# Patient Record
Sex: MALE | Race: BLACK OR AFRICAN AMERICAN | NOT HISPANIC OR LATINO | Employment: OTHER | URBAN - METROPOLITAN AREA
[De-identification: names, ages, dates, MRNs, and addresses within clinical notes are randomized per-mention and may not be internally consistent; named-entity substitution may affect disease eponyms.]

---

## 2018-03-19 ENCOUNTER — HOSPITAL ENCOUNTER (INPATIENT)
Facility: HOSPITAL | Age: 68
LOS: 4 days | Discharge: HOME | DRG: 331 | End: 2018-03-23
Attending: SURGERY | Admitting: SURGERY
Payer: MEDICARE

## 2018-03-19 DIAGNOSIS — R31.0 GROSS HEMATURIA: Primary | ICD-10-CM

## 2018-03-19 DIAGNOSIS — K61.1 PERIRECTAL ABSCESS: ICD-10-CM

## 2018-03-19 DIAGNOSIS — Z01.818 PREOP EXAMINATION: ICD-10-CM

## 2018-03-19 PROCEDURE — 11000001 HC ACUTE MED/SURG PRIVATE ROOM

## 2018-03-19 RX ORDER — SODIUM CHLORIDE, SODIUM LACTATE, POTASSIUM CHLORIDE, CALCIUM CHLORIDE 600; 310; 30; 20 MG/100ML; MG/100ML; MG/100ML; MG/100ML
INJECTION, SOLUTION INTRAVENOUS CONTINUOUS
Status: DISCONTINUED | OUTPATIENT
Start: 2018-03-20 | End: 2018-03-21

## 2018-03-19 RX ORDER — ACETAMINOPHEN 325 MG/1
650 TABLET ORAL EVERY 8 HOURS PRN
Status: DISCONTINUED | OUTPATIENT
Start: 2018-03-19 | End: 2018-03-23 | Stop reason: HOSPADM

## 2018-03-19 RX ORDER — HYDROMORPHONE HYDROCHLORIDE 2 MG/ML
0.5 INJECTION, SOLUTION INTRAMUSCULAR; INTRAVENOUS; SUBCUTANEOUS EVERY 4 HOURS PRN
Status: DISCONTINUED | OUTPATIENT
Start: 2018-03-19 | End: 2018-03-23 | Stop reason: HOSPADM

## 2018-03-19 RX ORDER — RAMELTEON 8 MG/1
8 TABLET ORAL NIGHTLY PRN
Status: DISCONTINUED | OUTPATIENT
Start: 2018-03-19 | End: 2018-03-23 | Stop reason: HOSPADM

## 2018-03-19 RX ORDER — DIPHENHYDRAMINE HYDROCHLORIDE 50 MG/ML
25 INJECTION INTRAMUSCULAR; INTRAVENOUS EVERY 4 HOURS PRN
Status: DISCONTINUED | OUTPATIENT
Start: 2018-03-19 | End: 2018-03-23 | Stop reason: HOSPADM

## 2018-03-19 RX ORDER — ONDANSETRON 8 MG/1
8 TABLET, ORALLY DISINTEGRATING ORAL EVERY 8 HOURS PRN
Status: DISCONTINUED | OUTPATIENT
Start: 2018-03-19 | End: 2018-03-23 | Stop reason: HOSPADM

## 2018-03-19 RX ORDER — SODIUM CHLORIDE 0.9 % (FLUSH) 0.9 %
3 SYRINGE (ML) INJECTION
Status: DISCONTINUED | OUTPATIENT
Start: 2018-03-19 | End: 2018-03-23 | Stop reason: HOSPADM

## 2018-03-20 ENCOUNTER — ANESTHESIA (OUTPATIENT)
Dept: SURGERY | Facility: HOSPITAL | Age: 68
DRG: 331 | End: 2018-03-20
Payer: MEDICARE

## 2018-03-20 ENCOUNTER — ANESTHESIA EVENT (OUTPATIENT)
Dept: SURGERY | Facility: HOSPITAL | Age: 68
DRG: 331 | End: 2018-03-20
Payer: MEDICARE

## 2018-03-20 ENCOUNTER — SURGERY (OUTPATIENT)
Age: 68
End: 2018-03-20

## 2018-03-20 LAB
ALBUMIN SERPL BCP-MCNC: 3 G/DL
ALBUMIN SERPL BCP-MCNC: 3.1 G/DL
ALP SERPL-CCNC: 68 U/L
ALP SERPL-CCNC: 69 U/L
ALT SERPL W/O P-5'-P-CCNC: 34 U/L
ALT SERPL W/O P-5'-P-CCNC: 34 U/L
ANION GAP SERPL CALC-SCNC: 7 MMOL/L
ANION GAP SERPL CALC-SCNC: 8 MMOL/L
ANISOCYTOSIS BLD QL SMEAR: SLIGHT
AST SERPL-CCNC: 32 U/L
AST SERPL-CCNC: 34 U/L
BASOPHILS # BLD AUTO: 0.02 K/UL
BASOPHILS NFR BLD: 0 %
BASOPHILS NFR BLD: 0.2 %
BILIRUB SERPL-MCNC: 1 MG/DL
BILIRUB SERPL-MCNC: 1 MG/DL
BUN SERPL-MCNC: 10 MG/DL
BUN SERPL-MCNC: 10 MG/DL
CALCIUM SERPL-MCNC: 9 MG/DL
CALCIUM SERPL-MCNC: 9.1 MG/DL
CHLORIDE SERPL-SCNC: 103 MMOL/L
CHLORIDE SERPL-SCNC: 103 MMOL/L
CO2 SERPL-SCNC: 25 MMOL/L
CO2 SERPL-SCNC: 27 MMOL/L
CREAT SERPL-MCNC: 0.8 MG/DL
CREAT SERPL-MCNC: 0.9 MG/DL
DIFFERENTIAL METHOD: ABNORMAL
DIFFERENTIAL METHOD: ABNORMAL
EOSINOPHIL # BLD AUTO: 0.1 K/UL
EOSINOPHIL NFR BLD: 0.8 %
EOSINOPHIL NFR BLD: 1 %
ERYTHROCYTE [DISTWIDTH] IN BLOOD BY AUTOMATED COUNT: 14.5 %
ERYTHROCYTE [DISTWIDTH] IN BLOOD BY AUTOMATED COUNT: 14.5 %
EST. GFR  (AFRICAN AMERICAN): >60 ML/MIN/1.73 M^2
EST. GFR  (AFRICAN AMERICAN): >60 ML/MIN/1.73 M^2
EST. GFR  (NON AFRICAN AMERICAN): >60 ML/MIN/1.73 M^2
EST. GFR  (NON AFRICAN AMERICAN): >60 ML/MIN/1.73 M^2
GLUCOSE SERPL-MCNC: 106 MG/DL
GLUCOSE SERPL-MCNC: 158 MG/DL
GRAM STN SPEC: NORMAL
HCT VFR BLD AUTO: 35.3 %
HCT VFR BLD AUTO: 35.3 %
HGB BLD-MCNC: 11 G/DL
HGB BLD-MCNC: 11.1 G/DL
HYPOCHROMIA BLD QL SMEAR: ABNORMAL
LYMPHOCYTES # BLD AUTO: 0.9 K/UL
LYMPHOCYTES NFR BLD: 10 %
LYMPHOCYTES NFR BLD: 9.8 %
MCH RBC QN AUTO: 23 PG
MCH RBC QN AUTO: 23.1 PG
MCHC RBC AUTO-ENTMCNC: 31.2 G/DL
MCHC RBC AUTO-ENTMCNC: 31.4 G/DL
MCV RBC AUTO: 74 FL
MCV RBC AUTO: 74 FL
MONOCYTES # BLD AUTO: 1.5 K/UL
MONOCYTES NFR BLD: 11 %
MONOCYTES NFR BLD: 15.9 %
NEUTROPHILS # BLD AUTO: 6.9 K/UL
NEUTROPHILS NFR BLD: 73.3 %
NEUTROPHILS NFR BLD: 78 %
OVALOCYTES BLD QL SMEAR: ABNORMAL
PLATELET # BLD AUTO: 179 K/UL
PLATELET # BLD AUTO: 204 K/UL
PLATELET BLD QL SMEAR: ABNORMAL
PLATELET BLD QL SMEAR: ABNORMAL
PMV BLD AUTO: 10.2 FL
PMV BLD AUTO: 9.5 FL
POCT GLUCOSE: 120 MG/DL (ref 70–110)
POIKILOCYTOSIS BLD QL SMEAR: SLIGHT
POTASSIUM SERPL-SCNC: 3.7 MMOL/L
POTASSIUM SERPL-SCNC: 4 MMOL/L
PROT SERPL-MCNC: 6.8 G/DL
PROT SERPL-MCNC: 6.9 G/DL
RBC # BLD AUTO: 4.78 M/UL
RBC # BLD AUTO: 4.8 M/UL
SODIUM SERPL-SCNC: 136 MMOL/L
SODIUM SERPL-SCNC: 137 MMOL/L
WBC # BLD AUTO: 9.43 K/UL
WBC # BLD AUTO: 9.98 K/UL

## 2018-03-20 PROCEDURE — 63600175 PHARM REV CODE 636 W HCPCS: Performed by: NURSE ANESTHETIST, CERTIFIED REGISTERED

## 2018-03-20 PROCEDURE — 85025 COMPLETE CBC W/AUTO DIFF WBC: CPT

## 2018-03-20 PROCEDURE — 80053 COMPREHEN METABOLIC PANEL: CPT

## 2018-03-20 PROCEDURE — 37000008 HC ANESTHESIA 1ST 15 MINUTES: Performed by: SURGERY

## 2018-03-20 PROCEDURE — 87076 CULTURE ANAEROBE IDENT EACH: CPT | Mod: 59

## 2018-03-20 PROCEDURE — 0D9P00Z DRAINAGE OF RECTUM WITH DRAINAGE DEVICE, OPEN APPROACH: ICD-10-PCS | Performed by: SURGERY

## 2018-03-20 PROCEDURE — 63600175 PHARM REV CODE 636 W HCPCS: Performed by: SURGERY

## 2018-03-20 PROCEDURE — 71000033 HC RECOVERY, INTIAL HOUR: Performed by: SURGERY

## 2018-03-20 PROCEDURE — 25000003 PHARM REV CODE 250: Performed by: SURGERY

## 2018-03-20 PROCEDURE — 36415 COLL VENOUS BLD VENIPUNCTURE: CPT

## 2018-03-20 PROCEDURE — 87075 CULTR BACTERIA EXCEPT BLOOD: CPT

## 2018-03-20 PROCEDURE — 80053 COMPREHEN METABOLIC PANEL: CPT | Mod: 91

## 2018-03-20 PROCEDURE — 71000039 HC RECOVERY, EACH ADD'L HOUR: Performed by: SURGERY

## 2018-03-20 PROCEDURE — 85027 COMPLETE CBC AUTOMATED: CPT

## 2018-03-20 PROCEDURE — 85007 BL SMEAR W/DIFF WBC COUNT: CPT

## 2018-03-20 PROCEDURE — 94761 N-INVAS EAR/PLS OXIMETRY MLT: CPT

## 2018-03-20 PROCEDURE — 93005 ELECTROCARDIOGRAM TRACING: CPT

## 2018-03-20 PROCEDURE — 25000003 PHARM REV CODE 250: Performed by: NURSE ANESTHETIST, CERTIFIED REGISTERED

## 2018-03-20 PROCEDURE — 36000705 HC OR TIME LEV I EA ADD 15 MIN: Performed by: SURGERY

## 2018-03-20 PROCEDURE — 94799 UNLISTED PULMONARY SVC/PX: CPT

## 2018-03-20 PROCEDURE — 87205 SMEAR GRAM STAIN: CPT

## 2018-03-20 PROCEDURE — 87070 CULTURE OTHR SPECIMN AEROBIC: CPT

## 2018-03-20 PROCEDURE — 36000704 HC OR TIME LEV I 1ST 15 MIN: Performed by: SURGERY

## 2018-03-20 PROCEDURE — 87077 CULTURE AEROBIC IDENTIFY: CPT

## 2018-03-20 PROCEDURE — 87186 SC STD MICRODIL/AGAR DIL: CPT

## 2018-03-20 PROCEDURE — 11000001 HC ACUTE MED/SURG PRIVATE ROOM

## 2018-03-20 PROCEDURE — 37000009 HC ANESTHESIA EA ADD 15 MINS: Performed by: SURGERY

## 2018-03-20 RX ORDER — HYDROMORPHONE HYDROCHLORIDE 2 MG/ML
0.4 INJECTION, SOLUTION INTRAMUSCULAR; INTRAVENOUS; SUBCUTANEOUS EVERY 5 MIN PRN
Status: DISCONTINUED | OUTPATIENT
Start: 2018-03-20 | End: 2018-03-23 | Stop reason: HOSPADM

## 2018-03-20 RX ORDER — MIDAZOLAM HYDROCHLORIDE 1 MG/ML
INJECTION INTRAMUSCULAR; INTRAVENOUS
Status: DISCONTINUED | OUTPATIENT
Start: 2018-03-20 | End: 2018-03-20

## 2018-03-20 RX ORDER — ACETAMINOPHEN 10 MG/ML
INJECTION, SOLUTION INTRAVENOUS
Status: DISCONTINUED | OUTPATIENT
Start: 2018-03-20 | End: 2018-03-20

## 2018-03-20 RX ORDER — OXYCODONE AND ACETAMINOPHEN 5; 325 MG/1; MG/1
2 TABLET ORAL EVERY 6 HOURS PRN
Status: DISCONTINUED | OUTPATIENT
Start: 2018-03-20 | End: 2018-03-23 | Stop reason: HOSPADM

## 2018-03-20 RX ORDER — BUPIVACAINE HYDROCHLORIDE 2.5 MG/ML
INJECTION, SOLUTION EPIDURAL; INFILTRATION; INTRACAUDAL
Status: DISCONTINUED | OUTPATIENT
Start: 2018-03-20 | End: 2018-03-20 | Stop reason: HOSPADM

## 2018-03-20 RX ORDER — SODIUM CHLORIDE 0.9 % (FLUSH) 0.9 %
3 SYRINGE (ML) INJECTION
Status: DISCONTINUED | OUTPATIENT
Start: 2018-03-20 | End: 2018-03-23 | Stop reason: HOSPADM

## 2018-03-20 RX ORDER — PHENYLEPHRINE HYDROCHLORIDE 10 MG/ML
INJECTION INTRAVENOUS
Status: DISCONTINUED | OUTPATIENT
Start: 2018-03-20 | End: 2018-03-20

## 2018-03-20 RX ORDER — SODIUM CHLORIDE 0.9 G/100ML
IRRIGANT IRRIGATION
Status: DISCONTINUED | OUTPATIENT
Start: 2018-03-20 | End: 2018-03-20 | Stop reason: HOSPADM

## 2018-03-20 RX ORDER — ONDANSETRON HYDROCHLORIDE 2 MG/ML
INJECTION, SOLUTION INTRAMUSCULAR; INTRAVENOUS
Status: DISCONTINUED | OUTPATIENT
Start: 2018-03-20 | End: 2018-03-20

## 2018-03-20 RX ORDER — KETOROLAC TROMETHAMINE 30 MG/ML
15 INJECTION, SOLUTION INTRAMUSCULAR; INTRAVENOUS EVERY 6 HOURS
Status: COMPLETED | OUTPATIENT
Start: 2018-03-20 | End: 2018-03-21

## 2018-03-20 RX ORDER — HYDROMORPHONE HYDROCHLORIDE 2 MG/ML
INJECTION, SOLUTION INTRAMUSCULAR; INTRAVENOUS; SUBCUTANEOUS
Status: DISCONTINUED | OUTPATIENT
Start: 2018-03-20 | End: 2018-03-20

## 2018-03-20 RX ORDER — SODIUM CHLORIDE 9 MG/ML
INJECTION, SOLUTION INTRAVENOUS CONTINUOUS PRN
Status: DISCONTINUED | OUTPATIENT
Start: 2018-03-20 | End: 2018-03-20

## 2018-03-20 RX ORDER — BACITRACIN 50000 [IU]/1
INJECTION, POWDER, FOR SOLUTION INTRAMUSCULAR
Status: DISCONTINUED | OUTPATIENT
Start: 2018-03-20 | End: 2018-03-20 | Stop reason: HOSPADM

## 2018-03-20 RX ORDER — SUCCINYLCHOLINE CHLORIDE 20 MG/ML
INJECTION INTRAMUSCULAR; INTRAVENOUS
Status: DISCONTINUED | OUTPATIENT
Start: 2018-03-20 | End: 2018-03-20

## 2018-03-20 RX ORDER — ONDANSETRON 2 MG/ML
4 INJECTION INTRAMUSCULAR; INTRAVENOUS DAILY PRN
Status: DISCONTINUED | OUTPATIENT
Start: 2018-03-20 | End: 2018-03-23 | Stop reason: HOSPADM

## 2018-03-20 RX ORDER — POLYETHYLENE GLYCOL 3350 17 G/17G
17 POWDER, FOR SOLUTION ORAL DAILY
Status: DISCONTINUED | OUTPATIENT
Start: 2018-03-20 | End: 2018-03-23 | Stop reason: HOSPADM

## 2018-03-20 RX ORDER — LIDOCAINE HCL/PF 100 MG/5ML
SYRINGE (ML) INTRAVENOUS
Status: DISCONTINUED | OUTPATIENT
Start: 2018-03-20 | End: 2018-03-20

## 2018-03-20 RX ORDER — FENTANYL CITRATE 50 UG/ML
INJECTION, SOLUTION INTRAMUSCULAR; INTRAVENOUS
Status: DISCONTINUED | OUTPATIENT
Start: 2018-03-20 | End: 2018-03-20

## 2018-03-20 RX ORDER — PROPOFOL 10 MG/ML
VIAL (ML) INTRAVENOUS
Status: DISCONTINUED | OUTPATIENT
Start: 2018-03-20 | End: 2018-03-20

## 2018-03-20 RX ORDER — ROCURONIUM BROMIDE 10 MG/ML
INJECTION, SOLUTION INTRAVENOUS
Status: DISCONTINUED | OUTPATIENT
Start: 2018-03-20 | End: 2018-03-20

## 2018-03-20 RX ADMIN — MIDAZOLAM HYDROCHLORIDE 2 MG: 1 INJECTION, SOLUTION INTRAMUSCULAR; INTRAVENOUS at 11:03

## 2018-03-20 RX ADMIN — BUPIVACAINE HYDROCHLORIDE 15 ML: 2.5 INJECTION, SOLUTION EPIDURAL; INFILTRATION; INTRACAUDAL; PERINEURAL at 01:03

## 2018-03-20 RX ADMIN — PHENYLEPHRINE HYDROCHLORIDE 100 MCG: 10 INJECTION INTRAVENOUS at 12:03

## 2018-03-20 RX ADMIN — LIDOCAINE HYDROCHLORIDE 80 MG: 20 INJECTION, SOLUTION INTRAVENOUS at 12:03

## 2018-03-20 RX ADMIN — PIPERACILLIN AND TAZOBACTAM 4.5 G: 4; .5 INJECTION, POWDER, LYOPHILIZED, FOR SOLUTION INTRAVENOUS; PARENTERAL at 09:03

## 2018-03-20 RX ADMIN — KETOROLAC TROMETHAMINE 15 MG: 30 INJECTION, SOLUTION INTRAMUSCULAR at 11:03

## 2018-03-20 RX ADMIN — KETOROLAC TROMETHAMINE 15 MG: 30 INJECTION, SOLUTION INTRAMUSCULAR at 05:03

## 2018-03-20 RX ADMIN — POLYETHYLENE GLYCOL 3350 17 G: 17 POWDER, FOR SOLUTION ORAL at 04:03

## 2018-03-20 RX ADMIN — HYDROMORPHONE HYDROCHLORIDE 0.5 MG: 2 INJECTION INTRAMUSCULAR; INTRAVENOUS; SUBCUTANEOUS at 08:03

## 2018-03-20 RX ADMIN — PIPERACILLIN AND TAZOBACTAM 4.5 G: 4; .5 INJECTION, POWDER, LYOPHILIZED, FOR SOLUTION INTRAVENOUS; PARENTERAL at 12:03

## 2018-03-20 RX ADMIN — SODIUM CHLORIDE 1000 ML: 0.9 IRRIGANT IRRIGATION at 01:03

## 2018-03-20 RX ADMIN — SODIUM CHLORIDE: 0.9 INJECTION, SOLUTION INTRAVENOUS at 11:03

## 2018-03-20 RX ADMIN — HYDROMORPHONE HYDROCHLORIDE 0.5 MG: 2 INJECTION INTRAMUSCULAR; INTRAVENOUS; SUBCUTANEOUS at 12:03

## 2018-03-20 RX ADMIN — HYDROMORPHONE HYDROCHLORIDE 0.2 MG: 2 INJECTION INTRAMUSCULAR; INTRAVENOUS; SUBCUTANEOUS at 01:03

## 2018-03-20 RX ADMIN — SODIUM CHLORIDE, POTASSIUM CHLORIDE, SODIUM LACTATE AND CALCIUM CHLORIDE: 600; 310; 30; 20 INJECTION, SOLUTION INTRAVENOUS at 12:03

## 2018-03-20 RX ADMIN — PIPERACILLIN AND TAZOBACTAM 4.5 G: 4; .5 INJECTION, POWDER, LYOPHILIZED, FOR SOLUTION INTRAVENOUS; PARENTERAL at 04:03

## 2018-03-20 RX ADMIN — RAMELTEON 8 MG: 8 TABLET, FILM COATED ORAL at 12:03

## 2018-03-20 RX ADMIN — ROCURONIUM BROMIDE 5 MG: 10 INJECTION, SOLUTION INTRAVENOUS at 12:03

## 2018-03-20 RX ADMIN — ACETAMINOPHEN 1000 MG: 10 INJECTION, SOLUTION INTRAVENOUS at 12:03

## 2018-03-20 RX ADMIN — HYDROMORPHONE HYDROCHLORIDE 0.5 MG: 2 INJECTION INTRAMUSCULAR; INTRAVENOUS; SUBCUTANEOUS at 04:03

## 2018-03-20 RX ADMIN — PROPOFOL 200 MG: 10 INJECTION, EMULSION INTRAVENOUS at 12:03

## 2018-03-20 RX ADMIN — BACITRACIN 50000 UNITS: 5000 INJECTION, POWDER, FOR SOLUTION INTRAMUSCULAR at 01:03

## 2018-03-20 RX ADMIN — ONDANSETRON 4 MG: 2 INJECTION, SOLUTION INTRAMUSCULAR; INTRAVENOUS at 12:03

## 2018-03-20 RX ADMIN — SUCCINYLCHOLINE CHLORIDE 160 MG: 20 INJECTION, SOLUTION INTRAMUSCULAR; INTRAVENOUS at 12:03

## 2018-03-20 RX ADMIN — FENTANYL CITRATE 100 MCG: 50 INJECTION, SOLUTION INTRAMUSCULAR; INTRAVENOUS at 12:03

## 2018-03-20 NOTE — ANESTHESIA POSTPROCEDURE EVALUATION
Anesthesia Post Evaluation    Patient: Reza Del Cid    Procedure(s) Performed: Procedure(s) (LRB):  INCISION AND DRAINAGE (I & D)-ABSCESS teressa-rectal (N/A)    Final Anesthesia Type: general  Patient location during evaluation: PACU  Patient participation: Yes- Able to Participate  Level of consciousness: awake and alert  Post-procedure vital signs: reviewed and stable  Pain management: adequate  Airway patency: patent  PONV status at discharge: No PONV  Anesthetic complications: no      Cardiovascular status: hemodynamically stable and blood pressure returned to baseline  Respiratory status: room air, spontaneous ventilation and unassisted  Hydration status: euvolemic  Follow-up not needed.        Visit Vitals  /61   Pulse 72   Temp 36.7 °C (98.1 °F) (Skin)   Resp (!) 21   Ht 6' (1.829 m)   Wt 117.9 kg (260 lb)   SpO2 100%   BMI 35.26 kg/m²       Pain/Kaur Score: Pain Assessment Performed: Yes (3/20/2018  2:30 PM)  Presence of Pain: denies (3/20/2018  2:30 PM)  Pain Rating Prior to Med Admin: 8 (3/20/2018  8:53 AM)  Kaur Score: 10 (3/20/2018  2:30 PM)

## 2018-03-20 NOTE — PLAN OF CARE
"VSS. No changes noted. Denies pain or discomfort @ this time. "Ok to release to room", per Dr Mcguire. Report given to pt's nurse BENEDICT Villela, with time allotted for questions.   "

## 2018-03-20 NOTE — TRANSFER OF CARE
Anesthesia Transfer of Care Note    Patient: Reza Del Cid    Procedure(s) Performed: Procedure(s) (LRB):  INCISION AND DRAINAGE (I & D)-ABSCESS teressa-rectal (N/A)    Patient location: PACU    Anesthesia Type: general    Transport from OR: Transported from OR on 6-10 L/min O2 by face mask with adequate spontaneous ventilation    Post pain: adequate analgesia    Post assessment: no apparent anesthetic complications    Post vital signs: stable    Level of consciousness: sedated    Nausea/Vomiting: no nausea/vomiting    Complications: none    Transfer of care protocol was followed      Last vitals:   Visit Vitals  /64   Pulse 74   Temp 36.9 °C (98.4 °F) (Skin)   Resp 12   Ht 6' (1.829 m)   Wt 117.9 kg (260 lb)   SpO2 100%   BMI 35.26 kg/m²

## 2018-03-20 NOTE — H&P
Surgery History and Physical    CC: rectal pain    HPI: 67yoM with two-week history of teressa-rectal pain. Denies fevers/chills. Denies discharge. + decreased BMs due to pain. Tolerating regular diet, no N/V. Never had anything like this before. [facility]    ROS: negative for HA, fever/chills, changes in vision, chest pain, SOB, hematemesis, hematochezia, melena, weakness, or changes in mental status    Medical Hx: HTN, DM  Surgical Hx: none    History reviewed. No pertinent past medical history.  History reviewed. No pertinent surgical history.  Social History   Substance Use Topics    Smoking status: Never Smoker    Smokeless tobacco: Never Used    Alcohol use Not on file     History reviewed. No pertinent family history.  Review of patient's allergies indicates:  No Known Allergies  Home Medications:    Physical Exam:  Vitals:    03/20/18 0849   BP: (!) 114/57   Pulse: 74   Resp: 20   Temp: 100.3 °F (37.9 °C)     Gen: NAD, AAOx3  HEENT: NCAT, EOMI, MMM  CV: RRR  Pulm: CTA-B  Abd: soft, ND, NTTP  SREE: +fluctuance/induration of right gluteal fold near anus, no pain/fluctuance on rectal  Ext: 2+DP, MAEW  Skin: dry, intact  Mood: appropriate    Recent Labs      03/20/18   0011  03/20/18   0815   WBC  9.98  9.43   HGB  11.1*  11.0*   HCT  35.3*  35.3*   PLT  179  204   NA  137   --    K  3.7   --    BUN  10   --    CREATININE  0.9   --        Imaging: none here, [facility]    A/P: 67yoM with HTN, DM, teressa-rectal abscess  - admit to surgery service  - npo  - Zosyn  - to OR today for I&D    Emely Campo MD  HO-IV

## 2018-03-20 NOTE — OP NOTE
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 EUA  2. Incision and drainage of purulent material  3. Placement of penrose drain from either side to the posterior midline      GETA  Rt lateral position    Thiagarajan/ Jahaira  EBL: minimal  Speciemn: culture  To PACU, stable, no complications    2 penrose drain plced from either side towards posterior midline    [id number]

## 2018-03-20 NOTE — PROGRESS NOTES
Pharmacist Renal Dose Adjustment Note    Reza Del Cid is a 67 y.o. male being treated with the medication Zosyn 4.5g    Patient Data:    Vital Signs (Most Recent):  Temp: 98.1 °F (36.7 °C) (03/20/18 1400)  Pulse: 80 (03/20/18 1415)  Resp: 17 (03/20/18 1400)  BP: (!) 105/54 (03/20/18 1415)  SpO2: 100 % (03/20/18 1415)   Vital Signs (72h Range):  Temp:  [98.1 °F (36.7 °C)-100.3 °F (37.9 °C)]   Pulse:  [70-80]   Resp:  [12-20]   BP: (105-133)/(54-73)   SpO2:  [96 %-100 %]        Recent Labs     Lab 03/20/18  0011   CREATININE 0.9     Serum creatinine: 0.9 mg/dL 03/20/18 0011  Estimated creatinine clearance: 105.6 mL/min    Medication:Changed Zosyn 4.5g IV from Q12H to Q8H, per Renal Dose protocol.      Pharmacist's Name: Ellyn Hampton  Pharmacist's Extension: [phone]

## 2018-03-20 NOTE — PLAN OF CARE
Problem: Patient Care Overview  Goal: Plan of Care Review  Outcome: Ongoing (interventions implemented as appropriate)  Pt AAOx4, no family members at bedside. Pt complains of rectal pain but denies n/v/d and SOB. IVF infusing per MAR to PIV. Pt remains NPO past midnight. Safety maintained, will cont to monitor.

## 2018-03-20 NOTE — PLAN OF CARE
Problem: Patient Care Overview  Goal: Plan of Care Review  Outcome: Ongoing (interventions implemented as appropriate)  Pt AAOx4, resting in bed, NAD, VSS.  Pt has denied pain after procedure.  Also denies N/V and SOB.  Dressing to rectal area CDI.  IVFs and medications administered per MAR.  Instructed to call for assistance. Safety maintained.  Will continue to monitor.

## 2018-03-20 NOTE — ANESTHESIA PREPROCEDURE EVALUATION
03/20/2018  Reza Del Cid is a 67 y.o., male I & D teressa-rectal abscess under GETA.    Anesthesia Evaluation     I have reviewed the Nursing Notes.   I have reviewed the Medications.     Review of Systems  Anesthesia Hx:  Denies Family Hx of Anesthesia complications.   Social:  Non-Smoker, No Alcohol Use    Hematology/Oncology:         -- Anemia:   Cardiovascular:   Exercise tolerance: good Hypertension ECG has been reviewed.    Endocrine:   Diabetes        Physical Exam  General:  Well nourished    Airway/Jaw/Neck:  Airway Findings: Mouth Opening: Small, but > 3cm Tongue: Normal  Mallampati: III  TM Distance: Normal, at least 6 cm  Jaw/Neck Findings:  Neck ROM: Normal ROM      Dental:  Dental Findings: Periodontal disease, Severe    Chest/Lungs:  Chest/Lungs Clear    Heart/Vascular:  Heart Findings: Normal       Mental Status:  Mental Status Findings:  Alert and Oriented       Lab Results   Component Value Date    WBC 9.43 03/20/2018    HGB 11.0 (L) 03/20/2018    HCT 35.3 (L) 03/20/2018     03/20/2018    ALT 34 03/20/2018    AST 32 03/20/2018     03/20/2018    K 3.7 03/20/2018     03/20/2018    CREATININE 0.9 03/20/2018    BUN 10 03/20/2018    CO2 27 03/20/2018         Anesthesia Plan  Type of Anesthesia, risks & benefits discussed:  Anesthesia Type:  general  Patient's Preference:   Intra-op Monitoring Plan:   Intra-op Monitoring Plan Comments:   Post Op Pain Control Plan:   Post Op Pain Control Plan Comments:   Induction:    Beta Blocker:  Patient is not currently on a Beta-Blocker (No further documentation required).       Informed Consent: Patient understands risks and agrees with Anesthesia plan.  Questions answered. Anesthesia consent signed with patient.  ASA Score: 3     Day of Surgery Review of History & Physical:            Ready For Surgery From Anesthesia Perspective.

## 2018-03-20 NOTE — PROGRESS NOTES
TN rec'd a call from Keysha  -- [facility]    p# [phone]  ext [phone]      Keysha DRAPER Fax # [phone].      she will email the discharge worksheet needed for post discharge needs to TN.       per Keysha - pt may have HH; can't get skilled as his illness is not service connected.    TN also informed Michelle with [facility] - this pt may have [facility] benefits.

## 2018-03-20 NOTE — PROGRESS NOTES
.Pharmacy New Medication Education    Patient accepted medication education.    Pharmacy educated patient on name and purpose of medications and possible side effects, using the teach-back method.     D/C acetaminophen tablet 650 mg   D/C diphenhydrAMINE injection 25 mg   D/C hydromorphone (PF) injection 0.5 mg   D/C influenza (FLUZONE HIGH-DOSE) vaccine 0.5 mL   D/C lactated ringers infusion   D/C ondansetron disintegrating tablet 8 mg   D/C piperacillin-tazobactam 4.5 g in dextrose 5 % 100 mL IVPB (ready to mix system)   D/C pneumoc 13-mendoza conj-dip cr(PF) 0.5 mL   D/C promethazine (PHENERGAN) 6.25 mg in dextrose 5 % 50 mL IVPB   D/C ramelteon tablet 8 mg   D/C sodium chloride 0.9% flush 3 mL       Learners of pharmacy medication education included:  Patient    Patient +/- learner response:  Verbalized Understanding, Teachback

## 2018-03-20 NOTE — PLAN OF CARE
VSS. Denies pain or discomfort @ this time. Unable to locate family for update. Pt states no one here for update. Cont to monitor.

## 2018-03-20 NOTE — PLAN OF CARE
TN met with pt   lives with friends -Danis Gordillo   - primary contact:   [phone]   no dme, no hh prior to admit      TN rec'd a call from Keysha  -- [facility]    p# [phone]  ext [phone]      Keysha MACKEY. Fax # [phone].     she will email the discharge worksheet needed for post discharge needs to TN.        per Keysha - pt may have HH; can't get skilled as his illness is not service connected.     TN also informed Michelle with [facility] - this pt may have [facility] benefits.             pt uses the bus as primary transportation - may need assistance with trans. to home.    s/p i/d  perirectal abscess 2/20 03/20/18 1290   Discharge Assessment   Assessment Type Discharge Planning Assessment   Confirmed/corrected address and phone number on facesheet? Yes   Assessment information obtained from? Patient;Medical Record   Expected Length of Stay (days) 2   Communicated expected length of stay with patient/caregiver yes   Prior to hospitilization cognitive status: Alert/Oriented   Prior to hospitalization functional status: Independent   Current cognitive status: Alert/Oriented   Current Functional Status: Independent   Lives With other (see comments)  (friends )   Able to Return to Prior Arrangements yes   Is patient able to care for self after discharge? Unable to determine at this time (comments)   Patient's perception of discharge disposition home or selfcare   Readmission Within The Last 30 Days no previous admission in last 30 days   Patient currently being followed by outpatient case management? No   Patient currently receives any other outside agency services? No   Equipment Currently Used at Home none   Do you have any problems affording any of your prescribed medications? No  ([facility]s )   Is the patient taking medications as prescribed? yes   Does the patient have transportation home? No  (pt usually takes a bus - may need help with trans home at d/c )   Does the patient receive  services at the [facility]? No   Discharge Plan A Home   Discharge Plan B Home;Home Health   Patient/Family In Agreement With Plan yes

## 2018-03-21 LAB — POCT GLUCOSE: 108 MG/DL (ref 70–110)

## 2018-03-21 PROCEDURE — 94799 UNLISTED PULMONARY SVC/PX: CPT

## 2018-03-21 PROCEDURE — 63600175 PHARM REV CODE 636 W HCPCS: Performed by: SURGERY

## 2018-03-21 PROCEDURE — 25000003 PHARM REV CODE 250: Performed by: SURGERY

## 2018-03-21 PROCEDURE — 94761 N-INVAS EAR/PLS OXIMETRY MLT: CPT

## 2018-03-21 PROCEDURE — 11000001 HC ACUTE MED/SURG PRIVATE ROOM

## 2018-03-21 RX ADMIN — PIPERACILLIN AND TAZOBACTAM 4.5 G: 4; .5 INJECTION, POWDER, LYOPHILIZED, FOR SOLUTION INTRAVENOUS; PARENTERAL at 05:03

## 2018-03-21 RX ADMIN — POLYETHYLENE GLYCOL 3350 17 G: 17 POWDER, FOR SOLUTION ORAL at 08:03

## 2018-03-21 RX ADMIN — PIPERACILLIN AND TAZOBACTAM 4.5 G: 4; .5 INJECTION, POWDER, LYOPHILIZED, FOR SOLUTION INTRAVENOUS; PARENTERAL at 01:03

## 2018-03-21 RX ADMIN — KETOROLAC TROMETHAMINE 15 MG: 30 INJECTION, SOLUTION INTRAMUSCULAR at 11:03

## 2018-03-21 RX ADMIN — PIPERACILLIN AND TAZOBACTAM 4.5 G: 4; .5 INJECTION, POWDER, LYOPHILIZED, FOR SOLUTION INTRAVENOUS; PARENTERAL at 09:03

## 2018-03-21 RX ADMIN — KETOROLAC TROMETHAMINE 15 MG: 30 INJECTION, SOLUTION INTRAMUSCULAR at 05:03

## 2018-03-21 RX ADMIN — SODIUM CHLORIDE, POTASSIUM CHLORIDE, SODIUM LACTATE AND CALCIUM CHLORIDE: 600; 310; 30; 20 INJECTION, SOLUTION INTRAVENOUS at 05:03

## 2018-03-21 NOTE — OP NOTE
DATE OF PROCEDURE:      PREOPERATIVE DIAGNOSES:  Perirectal abscess, predominantly right side extending   posteriorly.    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    OPERATIVE PROCEDURE:  1.  Examination under anesthesia.  2.  Incision and drainage of the purulent material.  3.  Placement of Penrose drain from either side towards the posterior midline.     ANESTHESIA:  General endotracheal anesthesia and the patient in right lateral   position.    SURGEON:  Almita Wintres MD    SENIOR RESIDENT:  Emely Campo MD (RES)    BLOOD LOSS:  Minimal.    SPECIMEN:  Cultures taken from the perianal drainage    The patient is stable and transferred to the Recovery Room with no   complications.    HISTORY AND INDICATION:  This is a 67-year-old male with a 2-week history of   perirectal pain was having significant problems and therefore [facility]  He was transferred from the [facility]  to the [facility]   for surgical management.  The patient had significant tenderness in the   perirectal area.  He was more tender on the right side.  He had a CAT scan done   at the [facility], which showed about a large abscess collection on the   right side, about 6 x 6 cm.  The patient was placed on IV antibiotics and was   discussed about the option of surgery, the risk of procedures such as bleeding,   infection, DVT, PE, MI, stroke, recurrence, and the chance for fistula were all   discussed and consent was obtained.    FINDINGS:  The patient had a large abscess predominantly on the right side   consistent with a horseshoe abscess extending to the posterior midline and to   the left side.  A large amount of purulent material was drained.  The patient   had an incision made close to the posterior midline and incision was made on   either side.  Penrose drain was placed on each side towards the posterior   midline aspect.    PROCEDURE  IN DETAIL:  The patient was brought to the Operating Room, was placed   in supine position.  He was then sedated and intubated.  He was then placed in   the right lateral position.  The perianal area was prepped and draped in the   usual manner.  Complete examination was done after the patient was in the right   lateral position and has had significant tenderness at the floor.  His   examination could not be done as he was awake and was having excruciating pain.    The patient had an induration on palpation on the right side.  Initially, his   induration could not be appreciated on the surface.  Therefore, a 14-gauge   needle was placed and purulent material was aspirated.  Therefore, a stab   incision was made close to the posterior midline.  As soon as the incision was   made, a large amount of purulent material was drained.  The patient had a large   cavity, inside it was extending on to either side of the anal canal.  A   counterincision were made on either side.  All the pockets were completely   explored and drained.  Following adequate drainage, the whole area was irrigated   with the dilated hydrogen peroxide solution.  Meticulous hemostasis was   accomplished all around.  The pockets were all completely opened up and after   satisfactory drainage, Penrose drains were placed from either side towards the   posterior midline.  Blood loss was minimal.  Proper dressings were placed over   the area.  The patient was then placed back in supine position and was   extubated, taken to Recovery Room in stable condition.      PERICO  dd: 03/20/2018 17:50:35 (CDT)  td: 03/21/2018 01:14:54 (CDT)  Doc ID   #[id number]  Job ID #[id number]    CC:

## 2018-03-21 NOTE — PROGRESS NOTES
.Pharmacy New Medication Education    Patient accepted medication education.    Pharmacy educated patient on name and purpose of medications and possible side effects, using the teach-back method.     Current Inpatient Medication Orders   acetaminophen tablet 650 mg   diphenhydrAMINE injection 25 mg   hydromorphone (PF) injection 0.4 mg   hydromorphone (PF) injection 0.5 mg   influenza (FLUZONE HIGH-DOSE) vaccine 0.5 mL   ketorolac injection 15 mg   ondansetron disintegrating tablet 8 mg   ondansetron injection 4 mg   oxyCODONE-acetaminophen 5-325 mg per tablet 2 tablet   piperacillin-tazobactam 4.5 g in dextrose 5 % 100 mL IVPB (ready to mix system)   pneumoc 13-mendoza conj-dip cr(PF) 0.5 mL   polyethylene glycol packet 17 g   promethazine (PHENERGAN) 6.25 mg in dextrose 5 % 50 mL IVPB   ramelteon tablet 8 mg   sodium chloride 0.9% flush 3 mL   sodium chloride 0.9% flush 3 mL       Learners of pharmacy medication education included:  Patient    Patient +/- learner response:  Verbalized Understanding, Teachback

## 2018-03-21 NOTE — PLAN OF CARE
Problem: Patient Care Overview  Goal: Plan of Care Review  Outcome: Ongoing (interventions implemented as appropriate)  Pt AAOx4, no family members at bedside. Pt complains of minimal teressa-anal pain but denies n/v/d and SOB. Regular diet tolerated well. IVF infusing per MAR to PIV. Dressing to teressa-anal incision CDI. Safety maintained, will cont to monitor.

## 2018-03-21 NOTE — PROGRESS NOTES
Surgery Progress Note    POD#1 s/p perirectal abscess I&D    Overnight Events:  NAEON.  Pain much improved.  Afebrile since surgery.  Tolerating diet  Good UOP    Physical Exam:  Temp:  [97 °F (36.1 °C)-98.9 °F (37.2 °C)] 98.9 °F (37.2 °C)  Pulse:  [59-76] 64  Resp:  [16-20] 16  SpO2:  [96 %-100 %] 100 %  BP: (116-141)/(58-81) 134/69    Gen: NAD, AAOx3  CV: RRR  Pulm: unlabored breathing  Abd: soft, ND, NTTP  SREE- surgical dressing in place, not saturated  2+DP  Skin: dry/intact  Mood: appropriate    I/O last 3 completed shifts:  In: 2167.5 [I.V.:1867.5; IV Piggyback:300]  Out: 700 [Urine:700]      Labs:  Recent Labs      03/20/18   0011  03/20/18   0815   WBC  9.98  9.43   HGB  11.1*  11.0*   HCT  35.3*  35.3*   PLT  179  204   NA  137  136   K  3.7  4.0   CALCIUM  9.1  9.0   BUN  10  10   CREATININE  0.9  0.8       Imaging: none    Cultures:  3/20 abscess cultures: E coli, suscept pending    A/P:  67yoM with HTN, DM, teressa-rectal abscess POD#1 s/p I&D with penrose x 2 placement  - continue Zosyn until suscept results  - training on wound care  - f/u with social work for home health for wound care assistance  - penrose drains need to stay in place for atleast 7-14 days  - need daily or BID baths  - cover wound with gauze as needed for saturation/drainage  - dispo pending home health    Emely Campo MD  HO-IV

## 2018-03-21 NOTE — PLAN OF CARE
Problem: Patient Care Overview  Goal: Plan of Care Review  Outcome: Revised  Patient is awake, alert, and oriented.  Patient sits up in chair for a couple of hours this am.  Patient is instructed how to clean, and change teressa-anal abscess dressing.  A few days; supplies given to patient.  Patient continues to receive antibiotics.  Tolerates all meals without any nausea or vomiting.  Denies pain.  On scheduled Toradol.  Safety is maintained with bed low, wheels locked, and side rails up.  Call light within reach.  Will continue to monitor.

## 2018-03-22 PROBLEM — R31.0 GROSS HEMATURIA: Status: ACTIVE | Noted: 2018-03-22

## 2018-03-22 LAB
BACTERIA SPEC AEROBE CULT: NORMAL
POCT GLUCOSE: 105 MG/DL (ref 70–110)
POCT GLUCOSE: 88 MG/DL (ref 70–110)
POCT GLUCOSE: 91 MG/DL (ref 70–110)
POCT GLUCOSE: 92 MG/DL (ref 70–110)

## 2018-03-22 PROCEDURE — 63600175 PHARM REV CODE 636 W HCPCS: Performed by: SURGERY

## 2018-03-22 PROCEDURE — 25000003 PHARM REV CODE 250: Performed by: SURGERY

## 2018-03-22 PROCEDURE — 99223 1ST HOSP IP/OBS HIGH 75: CPT | Mod: ,,, | Performed by: UROLOGY

## 2018-03-22 PROCEDURE — 94761 N-INVAS EAR/PLS OXIMETRY MLT: CPT

## 2018-03-22 PROCEDURE — 94799 UNLISTED PULMONARY SVC/PX: CPT

## 2018-03-22 PROCEDURE — 11000001 HC ACUTE MED/SURG PRIVATE ROOM

## 2018-03-22 RX ORDER — OXYCODONE AND ACETAMINOPHEN 5; 325 MG/1; MG/1
1 TABLET ORAL EVERY 6 HOURS PRN
Qty: 30 TABLET | Refills: 0 | Status: SHIPPED | OUTPATIENT
Start: 2018-03-22 | End: 2018-08-22

## 2018-03-22 RX ORDER — METRONIDAZOLE 500 MG/1
500 TABLET ORAL EVERY 8 HOURS
Qty: 30 TABLET | Refills: 0 | Status: SHIPPED | OUTPATIENT
Start: 2018-03-22 | End: 2018-04-01

## 2018-03-22 RX ORDER — CIPROFLOXACIN 500 MG/1
500 TABLET ORAL EVERY 12 HOURS
Status: DISCONTINUED | OUTPATIENT
Start: 2018-03-22 | End: 2018-03-23 | Stop reason: HOSPADM

## 2018-03-22 RX ORDER — METRONIDAZOLE 500 MG/1
500 TABLET ORAL EVERY 8 HOURS
Status: DISCONTINUED | OUTPATIENT
Start: 2018-03-22 | End: 2018-03-23 | Stop reason: HOSPADM

## 2018-03-22 RX ORDER — CIPROFLOXACIN 500 MG/1
500 TABLET ORAL EVERY 12 HOURS
Qty: 20 TABLET | Refills: 0 | Status: SHIPPED | OUTPATIENT
Start: 2018-03-22 | End: 2018-04-01

## 2018-03-22 RX ADMIN — OXYCODONE AND ACETAMINOPHEN 2 TABLET: 5; 325 TABLET ORAL at 03:03

## 2018-03-22 RX ADMIN — PIPERACILLIN AND TAZOBACTAM 4.5 G: 4; .5 INJECTION, POWDER, LYOPHILIZED, FOR SOLUTION INTRAVENOUS; PARENTERAL at 04:03

## 2018-03-22 RX ADMIN — PIPERACILLIN AND TAZOBACTAM 4.5 G: 4; .5 INJECTION, POWDER, LYOPHILIZED, FOR SOLUTION INTRAVENOUS; PARENTERAL at 12:03

## 2018-03-22 RX ADMIN — CIPROFLOXACIN 500 MG: 500 TABLET, FILM COATED ORAL at 09:03

## 2018-03-22 RX ADMIN — OXYCODONE AND ACETAMINOPHEN 2 TABLET: 5; 325 TABLET ORAL at 06:03

## 2018-03-22 RX ADMIN — POLYETHYLENE GLYCOL 3350 17 G: 17 POWDER, FOR SOLUTION ORAL at 08:03

## 2018-03-22 RX ADMIN — OXYCODONE AND ACETAMINOPHEN 2 TABLET: 5; 325 TABLET ORAL at 08:03

## 2018-03-22 RX ADMIN — METRONIDAZOLE 500 MG: 500 TABLET ORAL at 09:03

## 2018-03-22 NOTE — PROGRESS NOTES
TN met with pt   [facility] ; ok fo f/u with surgeon in [place] per pt --   wound care instruction done by nurse; pt going home with supplies.      [facility] called [facility] to get [facility] apt  -- awaiting call back from office     Future Appointments  Date Time Provider Department Center   3/26/2018 1:45 PM Serafin Jalloh MD [facility] TUMOR [facility]

## 2018-03-22 NOTE — PROGRESS NOTES
Surgery Progress Note    POD#2 s/p perirectal abscess I&D    Overnight Events:  NAEON.  Pain much improved.  Afebrile since surgery.  Tolerating diet  New onset bloody output from penile meatus today with voiding  Denies pain currently    Physical Exam:  Temp:  [97.6 °F (36.4 °C)-98.9 °F (37.2 °C)] 97.6 °F (36.4 °C)  Pulse:  [54-76] 54  Resp:  [16-18] 18  SpO2:  [96 %-100 %] 98 %  BP: (128-145)/(69-81) 142/76    Gen: NAD, AAOx3  CV: RRR  Pulm: unlabored breathing  Abd: soft, ND, NTTP  Rectal: penrose drains in place, wound clean, serosang drainage  2+DP  Skin: dry/intact  Mood: appropriate    I/O last 3 completed shifts:  In: 1150 [P.O.:250; I.V.:600; IV Piggyback:300]  Out: 650 [Urine:650]    Labs:  Recent Labs      03/20/18   0011  03/20/18   0815   WBC  9.98  9.43   HGB  11.1*  11.0*   HCT  35.3*  35.3*   PLT  179  204   NA  137  136   K  3.7  4.0   CALCIUM  9.1  9.0   BUN  10  10   CREATININE  0.9  0.8       Imaging:    Cultures: 3/20 abscess cultures: E coli, suscept to cipro    A/P: 67yoM with HTN, DM, teressa-rectal abscess POD#2 s/p I&D with penrose x 2 placement  - discontinue zosyn, start po cipro/flagyl  - training on wound care proceeding- patient feels better about performing woudn care at [facility] f/u with social work for home health for wound care assistance  - penrose drains need to stay in place for atleast 7-14 days  - need daily or BID baths  - consult urology today for bloody penile discharge  - dispo pending urology consult        Emely Campo MD  -IV

## 2018-03-22 NOTE — PLAN OF CARE
Problem: Patient Care Overview  Goal: Plan of Care Review  Outcome: Revised  Patient is awake, alert, and oriented.  Patient's dressing changed per MD.  Less drainage compared to yesterday.  Patient continues to receive antibiotics.  Complained of pain and pain medications given with relief.  Patient voids without difficulty and has bloody meatus earlier.  Urologist saw patient.  No blood noted from meatus now.  Safety maintained.  Will continue to monitor.

## 2018-03-22 NOTE — PROGRESS NOTES
Called to room as patient complained of blood in the urine and as patient was wiping his meatus, bright red blood was on the wipe.  Dr. Campo in room and changed dressing to his teressa-anal wound.  Drainage has decreased.  Dr. Campo notified of blood red blood from meatus and blood in urine.  Will continue to monitor.

## 2018-03-22 NOTE — HPI
Urology consult for gross hematuria    Patient admitted 3/19/18 with perirectal abscess which was incised and drained on 3/20/18    Patient had episode of gross hematuria today which has been clearing.  When I come into the room to examine the patient he is just voided into a urinal and urine appears grossly clear    Patient denies dysuria.  He does not smoke cigarettes and there is no family history of kidney bladder or prostate cancer    Asian on no BPH medicines and overall he is satisfied with his voiding pattern.  He has a baseline okay strength of stream with nocturia ×2-3 and no straining to void

## 2018-03-22 NOTE — PLAN OF CARE
Problem: Patient Care Overview  Goal: Plan of Care Review  Outcome: Ongoing (interventions implemented as appropriate)  Patient resting in bed, AAOx4. Medications administered as ordered. Patient complained of some pain overnight, PRN medications administered. Patient ambulated to bathroom, safety maintained. BM x1. Dressing changed. Encouraged to call with needs or concerns. Will continue to monitor.

## 2018-03-22 NOTE — ASSESSMENT & PLAN NOTE
Plan.  I discussed at length in detail with the patient the need to evaluate blood in the urine.  I discussed that this is being done to rule out the presence of urothelial cancer.  I discussed the need for both imaging of the upper tracts and also cystoscopy.    I further discussed that this can be done as an outpatient.  Have patient follow up with me in the office in 2 weeks to work on arranging this workup.    Patient volunteers that [facility] might [facility]    Okay for discharge from urology standpoint.

## 2018-03-22 NOTE — SUBJECTIVE & OBJECTIVE
History reviewed. No pertinent past medical history.    History reviewed. No pertinent surgical history.    Review of patient's allergies indicates:  No Known Allergies    Family History     None          Social History Main Topics    Smoking status: Never Smoker    Smokeless tobacco: Never Used    Alcohol use Not on file    Drug use: Unknown    Sexual activity: Not on file       Review of Systems   Constitutional: Negative.    HENT: Negative.    Eyes: Negative.    Respiratory: Negative.    Cardiovascular: Negative.    Gastrointestinal: Positive for rectal pain.   Genitourinary:        Per history of present illness   Musculoskeletal: Negative.    Skin: Negative.    Neurological: Negative.    Psychiatric/Behavioral: Negative.        Objective:     Temp:  [97.6 °F (36.4 °C)-98.9 °F (37.2 °C)] 97.6 °F (36.4 °C)  Pulse:  [54-76] 54  Resp:  [16-18] 18  SpO2:  [97 %-100 %] 98 %  BP: (128-145)/(69-81) 142/76     Body mass index is 36.18 kg/m².      Date 03/22/18 0700 - 03/23/18 0659   Shift 2441-3212 4414-1695 9793-8761 24 Hour Total   I  N  T  A  K  E   Shift Total  (mL/kg)       O  U  T  P  U  T   Urine  (mL/kg/hr) 250   250    Shift Total  (mL/kg) 250  (2.1)   250  (2.1)   Weight (kg) 121 121 121 121          Drains     Drain                 Open Drain 03/20/18 1259 Posterior Buttock Penrose 1/4 inch 2 days                Physical Exam   Nursing note and vitals reviewed.  Constitutional: He is oriented to person, place, and time. He appears well-developed and well-nourished.   HENT:   Head: Normocephalic and atraumatic.   Eyes: Conjunctivae are normal. Pupils are equal, round, and reactive to light.   Neck: Normal range of motion. Neck supple.   Cardiovascular: Normal rate and regular rhythm.    Pulmonary/Chest: Effort normal and breath sounds normal.   Abdominal: Soft. There is no tenderness.   Genitourinary:               Musculoskeletal: Normal range of motion.   Neurological: He is alert and oriented to  person, place, and time.   Skin: Skin is warm and dry.     Psychiatric: He has a normal mood and affect. His behavior is normal.       Significant Labs:    BMP:    Recent Labs  Lab 03/20/18  0011 03/20/18  0815    136   K 3.7 4.0    103   CO2 27 25   BUN 10 10   CREATININE 0.9 0.8   CALCIUM 9.1 9.0       CBC:    Recent Labs  Lab 03/20/18 0011 03/20/18  0815   WBC 9.98 9.43   HGB 11.1* 11.0*   HCT 35.3* 35.3*    204       All pertinent labs results from the past 24 hours have been reviewed.    Significant Imaging:  All pertinent imaging results/findings from the past 24 hours have been reviewed.

## 2018-03-22 NOTE — CONSULTS
[facility]  Urology  Consult Note    Patient Name: Reza Del Cid  MRN: [id number]  Admission Date: 3/19/2018  Hospital Length of Stay: 3   Code Status: Full Code   Attending Provider: Serafin Jalloh MD   Consulting Provider: Vanessa Rodriguez MD  Primary Care Physician: Slim Tolliver MD  Principal Problem:Perirectal abscess    Inpatient consult to Urology  Consult performed by: VANESSA RODRIGUEZ  Consult ordered by: KATIE PIMENTEL  Assessment/Recommendations: Gross hematuria   Plan.  I discussed at length in detail with the patient the need to evaluate blood in the urine.  I discussed that this is being done to rule out the presence of urothelial cancer.  I discussed the need for both imaging of the upper tracts and also cystoscopy.    I further discussed that this can be done as an outpatient.  Have patient follow up with me in the office in 2 weeks to work on arranging this workup.    Patient volunteers that he might end up having this workup at the [facility] for discharge from urology standpoint.              Subjective:     HPI:  Urology consult for gross hematuria    Patient admitted 3/19/18 with perirectal abscess which was incised and drained on 3/20/18    Patient had episode of gross hematuria today which has been clearing.  When I come into the room to examine the patient he is just voided into a urinal and urine appears grossly clear    Patient denies dysuria.  He does not smoke cigarettes and there is no family history of kidney bladder or prostate cancer    Asian on no BPH medicines and overall he is satisfied with his voiding pattern.  He has a baseline okay strength of stream with nocturia ×2-3 and no straining to void    History reviewed. No pertinent past medical history.    History reviewed. No pertinent surgical history.    Review of patient's allergies indicates:  No Known Allergies    Family History     None          Social History Main Topics    Smoking status:  Never Smoker    Smokeless tobacco: Never Used    Alcohol use Not on file    Drug use: Unknown    Sexual activity: Not on file       Review of Systems   Constitutional: Negative.    HENT: Negative.    Eyes: Negative.    Respiratory: Negative.    Cardiovascular: Negative.    Gastrointestinal: Positive for rectal pain.   Genitourinary:        Per history of present illness   Musculoskeletal: Negative.    Skin: Negative.    Neurological: Negative.    Psychiatric/Behavioral: Negative.        Objective:     Temp:  [97.6 °F (36.4 °C)-98.9 °F (37.2 °C)] 97.6 °F (36.4 °C)  Pulse:  [54-76] 54  Resp:  [16-18] 18  SpO2:  [97 %-100 %] 98 %  BP: (128-145)/(69-81) 142/76     Body mass index is 36.18 kg/m².      Date 03/22/18 0700 - 03/23/18 0659   Shift 7742-1836 3491-0380 6444-7178 24 Hour Total   I  N  T  A  K  E   Shift Total  (mL/kg)       O  U  T  P  U  T   Urine  (mL/kg/hr) 250   250    Shift Total  (mL/kg) 250  (2.1)   250  (2.1)   Weight (kg) 121 121 121 121          Drains     Drain                 Open Drain 03/20/18 1259 Posterior Buttock Penrose 1/4 inch 2 days                Physical Exam   Nursing note and vitals reviewed.  Constitutional: He is oriented to person, place, and time. He appears well-developed and well-nourished.   HENT:   Head: Normocephalic and atraumatic.   Eyes: Conjunctivae are normal. Pupils are equal, round, and reactive to light.   Neck: Normal range of motion. Neck supple.   Cardiovascular: Normal rate and regular rhythm.    Pulmonary/Chest: Effort normal and breath sounds normal.   Abdominal: Soft. There is no tenderness.   Genitourinary:               Musculoskeletal: Normal range of motion.   Neurological: He is alert and oriented to person, place, and time.   Skin: Skin is warm and dry.     Psychiatric: He has a normal mood and affect. His behavior is normal.       Significant Labs:    BMP:    Recent Labs  Lab 03/20/18  0011 03/20/18  0815    136   K 3.7 4.0    103   CO2 27  25   BUN 10 10   CREATININE 0.9 0.8   CALCIUM 9.1 9.0       CBC:    Recent Labs  Lab 03/20/18  0011 03/20/18  0815   WBC 9.98 9.43   HGB 11.1* 11.0*   HCT 35.3* 35.3*    204       All pertinent labs results from the past 24 hours have been reviewed.    Significant Imaging:  All pertinent imaging results/findings from the past 24 hours have been reviewed.                    Assessment and Plan:     Gross hematuria    Plan.  I discussed at length in detail with the patient the need to evaluate blood in the urine.  I discussed that this is being done to rule out the presence of urothelial cancer.  I discussed the need for both imaging of the upper tracts and also cystoscopy.    I further discussed that this can be done as an outpatient.  Have patient follow up with me in the office in 2 weeks to work on arranging this workup.    Patient [facility]    Okay for discharge from urology standpoint.            VTE Risk Mitigation         Ordered     IP VTE LOW RISK PATIENT  Once      03/19/18 1620          Thank you for your consult. I will sign off. Please contact us if you have any additional questions.    Win Rodriguez MD  Urology  [facility]

## 2018-03-23 VITALS
WEIGHT: 266.75 LBS | DIASTOLIC BLOOD PRESSURE: 90 MMHG | BODY MASS INDEX: 36.13 KG/M2 | SYSTOLIC BLOOD PRESSURE: 169 MMHG | RESPIRATION RATE: 19 BRPM | TEMPERATURE: 98 F | HEART RATE: 62 BPM | OXYGEN SATURATION: 94 % | HEIGHT: 72 IN

## 2018-03-23 PROBLEM — K61.1 PERIRECTAL ABSCESS: Status: RESOLVED | Noted: 2018-03-19 | Resolved: 2018-03-23

## 2018-03-23 LAB
POCT GLUCOSE: 94 MG/DL (ref 70–110)
POCT GLUCOSE: 95 MG/DL (ref 70–110)
POCT GLUCOSE: 99 MG/DL (ref 70–110)

## 2018-03-23 PROCEDURE — 99900035 HC TECH TIME PER 15 MIN (STAT)

## 2018-03-23 PROCEDURE — 94761 N-INVAS EAR/PLS OXIMETRY MLT: CPT

## 2018-03-23 PROCEDURE — 25000003 PHARM REV CODE 250: Performed by: SURGERY

## 2018-03-23 RX ORDER — LISINOPRIL 10 MG/1
10 TABLET ORAL DAILY
Status: DISCONTINUED | OUTPATIENT
Start: 2018-03-23 | End: 2018-03-23 | Stop reason: HOSPADM

## 2018-03-23 RX ADMIN — CIPROFLOXACIN 500 MG: 500 TABLET, FILM COATED ORAL at 08:03

## 2018-03-23 RX ADMIN — POLYETHYLENE GLYCOL 3350 17 G: 17 POWDER, FOR SOLUTION ORAL at 08:03

## 2018-03-23 RX ADMIN — LISINOPRIL 10 MG: 10 TABLET ORAL at 02:03

## 2018-03-23 RX ADMIN — METRONIDAZOLE 500 MG: 500 TABLET ORAL at 01:03

## 2018-03-23 RX ADMIN — METRONIDAZOLE 500 MG: 500 TABLET ORAL at 05:03

## 2018-03-23 NOTE — PLAN OF CARE
Problem: Patient Care Overview  Goal: Plan of Care Review  Outcome: Ongoing (interventions implemented as appropriate)  Patient resting in bed, AAOx4. Medications administered as ordered. Dressing remains CDI. No complaints of pain or discomfort overnight. Ambulated around room, safety maintained. Asleep on and off through the shift. Encouraged to call with needs or concerns. Will continue to monitor.

## 2018-03-23 NOTE — PROGRESS NOTES
Patient is being discharged home per MD.  Discharge instructions on medications, signs and symptoms when to call the MD, per-anal abscess careand follow-up visits are given to the patient.  Patient verbalized complete understanding on the above discharge instructions.  Dressing change supplies given to patient.  Awaiting delivery of medications from Outpatient Pharmacy.  Patient will return home per wheelchair Van.  Voiced no other concerns.  Safety maintained.

## 2018-03-23 NOTE — PLAN OF CARE
TN met with pt prior to discharge   as pt has no one to pick him up and was going to take the bus to home TN set up trans to home per wc van with SPD  --  at est 6:00 pm       f/u apts:    Follow-Up       Follow-up With  Details  Why  Contact Info   Slim Tolliver MD  On 3/27/2018  1:45 pm fax: [phone] [address] [address]  [phone]   Serafin Jalloh MD  On 4/2/2018  1:45 pm   [address]  [address]  [place] [address]  [phone]   Win Rodriguez MD  On 4/2/2018  10:30 am   [address]  [address]  [place] [address]  [phone]          pt instructed in care of wound;  given extra supplies   d/c nurse to review all meds, d/c instructions          03/23/18 0466   Final Note   Assessment Type Final Discharge Note   Discharge Disposition Home   What phone number can be called within the next 1-3 days to see how you are doing after discharge? [phone]   Hospital Follow Up  Appt(s) scheduled? Yes   Discharge plans and expectations educations in teach back method with documentation complete? Yes   Right Care Referral Info   Post Acute Recommendation No Care   Referral Type (no care )

## 2018-03-23 NOTE — PROGRESS NOTES
.Pharmacy New Medication Education    Patient accepted medication education.    Pharmacy educated patient on name and purpose of medications and possible side effects, using the teach-back method.     [id number]       Current Inpatient Medication Orders  acetaminophen tablet 650 mg  ciprofloxacin HCl tablet 500 mg  diphenhydrAMINE injection 25 mg  hydromorphone (PF) injection 0.4 mg  hydromorphone (PF) injection 0.5 mg  influenza (FLUZONE HIGH-DOSE) vaccine 0.5 mL  metroNIDAZOLE tablet 500 mg  ondansetron disintegrating tablet 8 mg  ondansetron injection 4 mg  oxyCODONE-acetaminophen 5-325 mg per tablet 2 tablet  pneumoc 13-mendoza conj-dip cr(PF) 0.5 mL  polyethylene glycol packet 17 g  promethazine (PHENERGAN) 6.25 mg in dextrose 5 % 50 mL IVPB  ramelteon tablet 8 mg  sodium chloride 0.9% flush 3 mL  sodium chloride 0.9% flush 3 mL    Learners of pharmacy medication education included:  Patient    Patient +/- learner response:  Verbalized Understanding, Teachback

## 2018-03-23 NOTE — DISCHARGE SUMMARY
[facility]  General Surgery  Discharge Summary      Patient Name: Reza Del Cid  MRN: [id number]  Admission Date: 3/19/2018  Hospital Length of Stay: 4 days  Discharge Date and Time:  03/23/2018 4:44 PM  Attending Physician: Serafin Jalloh MD   Discharging Provider: Katie Campo MD  Primary Care Provider: Slim Tolliver MD     HPI: 67[facility].    Procedure(s) (LRB):  INCISION AND DRAINAGE (I & D)-ABSCESS teressa-rectal (N/A)     Hospital Course: Patient admitted and taken to OR for I&D. Had large posterior horshoe teressa-rectal abscess. Two penrose drains placed. Patient continued on zosyn. Post-op patient did well. Symptoms resolved. He remained afebrile with normal WBC. Wound remained clean with serosang drainage. Cultures grew E. Coli sensitive to cipro and bacteroides. On POD#2 patient did experience painful urination and then had pb bleeding for urethral meatus. URology was consulted and recommended outpatient work up. PAtient was trained on local wound care. Patient was discharged home on 10 days of cipro/flagy, and with two penrose drains in place to be removed in clinic with Dr. Munguia in 7-10 days.    Consults:   Consults         Status Ordering Provider     Inpatient consult to Urology  Once     Provider:  Win Rodriguez MD    Completed KATIE CAMPO          Significant Diagnostic Studies: Microbiology: Wound Culture: positive for E.coli and bacteroides    Pending Diagnostic Studies:     None        Final Active Diagnoses:    Diagnosis Date Noted POA    Gross hematuria [R31.0] 03/22/2018 No      Problems Resolved During this Admission:    Diagnosis Date Noted Date Resolved POA    PRINCIPAL PROBLEM:  Perirectal abscess [K61.1] 03/19/2018 03/23/2018 Yes      Discharged Condition: good    Disposition: Home or Self Care    Follow Up:  Follow-up Information     Slim Tolliver MD.    Specialty:  Internal Medicine  Contact  information:  [address] [facility] [address] [facility] [address]  [phone]             Serafin Jalloh MD On 4/2/2018.    Specialty:  General Surgery  Why:  1:45 pm     Contact information:  [address]  [address]  [place] [address]  [phone]             Win Rodriguez MD In 1 week.    Specialty:  Urology  Contact information:  [address]  [address]  [facility] [address]  [phone]                 Patient Instructions:     Diet Adult Regular     Activity as tolerated     Notify your health care provider if you experience any of the following:  temperature >100.4     Notify your health care provider if you experience any of the following:  persistent nausea and vomiting or diarrhea     Notify your health care provider if you experience any of the following:  severe uncontrolled pain     Notify your health care provider if you experience any of the following:  redness, tenderness, or signs of infection (pain, swelling, redness, odor or green/yellow discharge around incision site)     Notify your health care provider if you experience any of the following:  difficulty breathing or increased cough     Change dressing (specify)   Order Comments: Change dressing as needed for saturation and BID.  Wash BID in bathtub, sitz baths.  Shower prn.  Leave drain in place until follow up appointment.       Medications:  Reconciled Home Medications:   Current Discharge Medication List      START taking these medications    Details   ciprofloxacin HCl (CIPRO) 500 MG tablet Take 1 tablet (500 mg total) by mouth every 12 (twelve) hours.  Qty: 20 tablet, Refills: 0      metroNIDAZOLE (FLAGYL) 500 MG tablet Take 1 tablet (500 mg total) by mouth every 8 (eight) hours.  Qty: 30 tablet, Refills: 0      oxyCODONE-acetaminophen (PERCOCET) 5-325 mg per tablet Take 1 tablet by mouth every 6 (six) hours as needed for Pain.  Qty: 30 tablet, Refills: 0             Emely Campo MD  General Surgery  [facility]

## 2018-03-26 ENCOUNTER — TELEPHONE (OUTPATIENT)
Dept: NEUROLOGY | Facility: HOSPITAL | Age: 68
End: 2018-03-26

## 2018-03-26 LAB — BACTERIA SPEC ANAEROBE CULT: NORMAL

## 2018-03-29 ENCOUNTER — OFFICE VISIT (OUTPATIENT)
Dept: NEUROLOGY | Facility: HOSPITAL | Age: 68
End: 2018-03-29
Attending: SURGERY
Payer: MEDICARE

## 2018-03-29 VITALS
WEIGHT: 271.38 LBS | DIASTOLIC BLOOD PRESSURE: 85 MMHG | HEIGHT: 72 IN | SYSTOLIC BLOOD PRESSURE: 143 MMHG | RESPIRATION RATE: 20 BRPM | TEMPERATURE: 98 F | HEART RATE: 59 BPM | BODY MASS INDEX: 36.76 KG/M2

## 2018-03-29 DIAGNOSIS — Z09 POSTOP CHECK: Primary | ICD-10-CM

## 2018-03-29 PROCEDURE — 99214 OFFICE O/P EST MOD 30 MIN: CPT | Performed by: SURGERY

## 2018-03-29 RX ORDER — ATORVASTATIN CALCIUM 20 MG/1
20 TABLET, FILM COATED ORAL DAILY
COMMUNITY

## 2018-03-29 RX ORDER — METFORMIN HYDROCHLORIDE 500 MG/1
500 TABLET ORAL 2 TIMES DAILY WITH MEALS
COMMUNITY

## 2018-03-29 RX ORDER — LISINOPRIL 10 MG/1
40 TABLET ORAL DAILY
COMMUNITY

## 2018-03-29 NOTE — PROGRESS NOTES
S/p I and d perianal abscess drinage    wound looks good    penrose drains removed    F/u 2 weeks

## 2018-03-29 NOTE — PATIENT INSTRUCTIONS
Use a sanitary pad to place in your underwear (available at pharmacy) Kotex, always, or store brand)   Return to clinic in 2 weeks

## 2018-04-02 ENCOUNTER — OFFICE VISIT (OUTPATIENT)
Dept: UROLOGY | Facility: CLINIC | Age: 68
End: 2018-04-02
Payer: MEDICARE

## 2018-04-02 VITALS
HEART RATE: 55 BPM | DIASTOLIC BLOOD PRESSURE: 71 MMHG | HEIGHT: 72 IN | SYSTOLIC BLOOD PRESSURE: 127 MMHG | WEIGHT: 271 LBS | BODY MASS INDEX: 36.7 KG/M2

## 2018-04-02 DIAGNOSIS — Z12.5 PROSTATE CANCER SCREENING: ICD-10-CM

## 2018-04-02 DIAGNOSIS — R31.0 GROSS HEMATURIA: Primary | ICD-10-CM

## 2018-04-02 DIAGNOSIS — N13.8 BPH WITH OBSTRUCTION/LOWER URINARY TRACT SYMPTOMS: ICD-10-CM

## 2018-04-02 DIAGNOSIS — N40.1 BPH WITH OBSTRUCTION/LOWER URINARY TRACT SYMPTOMS: ICD-10-CM

## 2018-04-02 LAB
BILIRUB SERPL-MCNC: ABNORMAL MG/DL
BLOOD URINE, POC: ABNORMAL
COLOR, POC UA: YELLOW
GLUCOSE UR QL STRIP: ABNORMAL
KETONES UR QL STRIP: ABNORMAL
LEUKOCYTE ESTERASE URINE, POC: ABNORMAL
NITRITE, POC UA: ABNORMAL
PH, POC UA: 5
PROTEIN, POC: ABNORMAL
SPECIFIC GRAVITY, POC UA: 1.02
UROBILINOGEN, POC UA: ABNORMAL

## 2018-04-02 PROCEDURE — 99214 OFFICE O/P EST MOD 30 MIN: CPT | Mod: S$PBB,,, | Performed by: UROLOGY

## 2018-04-02 PROCEDURE — 81002 URINALYSIS NONAUTO W/O SCOPE: CPT | Mod: PBBFAC,PO | Performed by: UROLOGY

## 2018-04-02 PROCEDURE — 99999 PR PBB SHADOW E&M-EST. PATIENT-LVL III: CPT | Mod: PBBFAC,,, | Performed by: UROLOGY

## 2018-04-02 PROCEDURE — 87086 URINE CULTURE/COLONY COUNT: CPT

## 2018-04-02 PROCEDURE — 99213 OFFICE O/P EST LOW 20 MIN: CPT | Mod: PBBFAC,PO | Performed by: UROLOGY

## 2018-04-02 NOTE — PROGRESS NOTES
This patient was last seen by me as a hospital consult on March 22, 2018 were patient was status post I&D of perirectal abscess.  He had gross hematuria.    Patient now comes in follow-up and reports no gross hematuria dysuria fevers or chills.    He has an in between strength of stream with nocturia ×1-2 and no straining to void.  He is satisfied with his voiding pattern on no BPH medicines    Physical exam reveals reveals a well-developed well-nourished patient  in no acute distress.  Patient is alert and oriented ×3 with normal mood and affect.  Respiratory effort is normal and there is no peripheral edema.  Skin is normal to inspection and palpation.  Packing in place status post incision and drainage of perirectal abscess.  Rectal exam deferred    /71 (BP Location: Left arm, Patient Position: Sitting)   Pulse (!) 55   Ht 6' (1.829 m)   Wt 122.9 kg (271 lb)   BMI 36.75 kg/m²   Review of Systems  General ROS: negative for chills, fever or weight loss  Respiratory ROS: no cough, shortness of breath, or wheezing  Cardiovascular ROS: no chest pain or dyspnea on exertion  Musculoskeletal ROS: negative for gait disturbance or muscular weakness    Family History   Problem Relation Age of Onset    Prostate cancer Neg Hx     Kidney disease Neg Hx      Past Medical History:   Diagnosis Date    Diabetes     HTN (hypertension)      Family History   Problem Relation Age of Onset    Prostate cancer Neg Hx     Kidney disease Neg Hx      Social History   Substance Use Topics    Smoking status: Never Smoker    Smokeless tobacco: Never Used    Alcohol use No       Impression:      ICD-10-CM ICD-9-CM    1. Gross hematuria R31.0 599.71 Urine culture      POCT URINE DIPSTICK WITHOUT MICROSCOPE      Cystoscopy   2. Prostate cancer screening Z12.5 V76.44    3. BPH with obstruction/lower urinary tract symptoms N40.1 600.01     N13.8 599.69        Plan:    #1.  Gross hematuria.  Plan.Plan.  I discussed at length in  detail with the patient the need to evaluate blood in the urine.  I discussed that this is being done to rule out the presence of urothelial cancer.  I discussed the need for both imaging of the upper tracts and also cystoscopy.  Patient voices understanding and agrees.  [facility]  We will have the patient sign forms wouldn't get results of the CT scan.  Patient will be scheduled for cystoscopy at next available time slot.    #2.  Prostate cancer screening.  Plan.  If patient has not had a [facility], then we will further discuss possible screening for  PSA.  In light of drainage of recent perirectal abscess, we'll avoid obtaining screening PSA now    #3.  BPH.  Plan.  Patient satisfied with his voiding pattern on no BPH medicines    Today I spent 25 minutes with the patient, more than 50% of which was spent counseling and coordinating care concerning treatment of issues as detailed above.    PLEASE NOTE:  Please be advised that portions of this note were dictated using voice recognition software and may contain dictation related errors in spelling/grammar/appropriate pronouns/syntax or other errors that might have not been found and or corrected on text review.

## 2018-04-03 LAB — BACTERIA UR CULT: NO GROWTH

## 2018-04-04 ENCOUNTER — TELEPHONE (OUTPATIENT)
Dept: UROLOGY | Facility: CLINIC | Age: 68
End: 2018-04-04

## 2018-04-04 NOTE — TELEPHONE ENCOUNTER
----- Message from Win Rodriguez MD sent at 4/4/2018  8:55 AM CDT -----  Please contact patient and let patient know that recent urine culture was negative.

## 2018-04-09 ENCOUNTER — TELEPHONE (OUTPATIENT)
Dept: UROLOGY | Facility: CLINIC | Age: 68
End: 2018-04-09

## 2018-04-09 NOTE — TELEPHONE ENCOUNTER
----- Message from Germaine Mims sent at 4/9/2018  4:37 PM CDT -----  Contact: [phone]/dqu  Patient requesting to speak with you regarding changing his appt tomorrow to a different time or day. Please advise.

## 2018-04-09 NOTE — TELEPHONE ENCOUNTER
----- Message from Elizabeth Adler sent at 4/6/2018  6:07 PM CDT -----  Contact: self  Pt called after hours and stated that he has an appt for a procedure scheduled on Tuesday 4/10/18 at 2:00 pm. Pt stated that this time does not work for him, as it is too late in the day. Pt is requesting to have this appt rescheduled for an earlier time in the day if at all possible. Pt would like for the nurse to give him a call back to discuss.    Pt can be reached at [phone] (cell)

## 2018-04-10 NOTE — TELEPHONE ENCOUNTER
----- Message from Deanna Nielsen sent at 4/10/2018  8:35 AM CDT -----  No. [phone]    Patient returned your call.

## 2018-04-10 NOTE — TELEPHONE ENCOUNTER
Patient came into office.  Cysto was rescheduled for 4/16/18 at 0830.  Patient informed me his contact is the best number to reach him.  Did not need to update phone number.

## 2018-04-10 NOTE — TELEPHONE ENCOUNTER
Returned call, no answer.  Please transfer patient to [phone] so that I may speak with him.  Thanks.

## 2018-04-16 ENCOUNTER — PROCEDURE VISIT (OUTPATIENT)
Dept: UROLOGY | Facility: CLINIC | Age: 68
End: 2018-04-16
Payer: MEDICARE

## 2018-04-16 VITALS
DIASTOLIC BLOOD PRESSURE: 73 MMHG | HEIGHT: 72 IN | WEIGHT: 271 LBS | SYSTOLIC BLOOD PRESSURE: 131 MMHG | HEART RATE: 61 BPM | BODY MASS INDEX: 36.7 KG/M2

## 2018-04-16 DIAGNOSIS — N13.8 BPH WITH OBSTRUCTION/LOWER URINARY TRACT SYMPTOMS: ICD-10-CM

## 2018-04-16 DIAGNOSIS — N40.1 BPH WITH OBSTRUCTION/LOWER URINARY TRACT SYMPTOMS: ICD-10-CM

## 2018-04-16 DIAGNOSIS — R31.0 GROSS HEMATURIA: Primary | ICD-10-CM

## 2018-04-16 LAB
BILIRUB SERPL-MCNC: NORMAL MG/DL
BLOOD URINE, POC: NORMAL
COLOR, POC UA: YELLOW
GLUCOSE UR QL STRIP: NORMAL
KETONES UR QL STRIP: NORMAL
LEUKOCYTE ESTERASE URINE, POC: NORMAL
NITRITE, POC UA: NORMAL
PH, POC UA: 5
PROTEIN, POC: NORMAL
SPECIFIC GRAVITY, POC UA: 1.01
UROBILINOGEN, POC UA: NORMAL

## 2018-04-16 PROCEDURE — 99499 UNLISTED E&M SERVICE: CPT | Mod: S$PBB,,, | Performed by: UROLOGY

## 2018-04-16 PROCEDURE — 81002 URINALYSIS NONAUTO W/O SCOPE: CPT | Mod: PBBFAC,PO | Performed by: UROLOGY

## 2018-04-16 PROCEDURE — 52000 CYSTOURETHROSCOPY: CPT | Mod: S$PBB,,, | Performed by: UROLOGY

## 2018-04-16 PROCEDURE — 52000 CYSTOURETHROSCOPY: CPT | Mod: PBBFAC,PO | Performed by: UROLOGY

## 2018-04-16 NOTE — PROCEDURES
Procedures     PLEASE NOTE:  Please be advised that portions of this note were dictated using voice recognition software and may contain dictation related errors in spelling/grammar/appropriate pronouns/syntax or other errors that might have not been found and or corrected on text review.      Procedures: Flexible cystourethroscopy    Pre Procedure Diagnosis: Gross hematuria    Post Procedure Diagnosis: Same of presumed benign etiology pending receipt of outside CT scan    Date of Procedure. 04/16/2018    Indications.  This gentleman with gross hematuria presents now for evaluation.  [facility] and we are still awaiting receipt of the report    Surgeon: Win Rodriguez MD    Anesthesia: 2% uro-jet lidocaine jelly for local analgesia    Flexible cysto-urethroscopy was performed after consent was obtained.    2% lidocaine urojet was used for local analgesia.  The genitalia were prepped and draped in the usual sterile fashion.    The flexible scope was advanced into the urethra and into the bladder.  Bilateral ureteral orifices were evaluated and noted to be normal with clear efflux.  The bladder was completely surveyed in a systematic fashion.   No bladder tumors or lesions were seen.  No strictures were noted.  The prostate showed 15-20 g of trilobar hyperplasia    The patient tolerated the procedure well without complication.    Impression:       Gross          hematuria of presumed benign etiology.    Plan.  This completes necessary evaluation on this patient.  No further evaluation needed unless patient develops recurrent gross hematuria    [facility]  He states he had a recent CT scan as well as PSA done there.    Appointment made for 1 month for follow-up to assure that these tests have been received    #2.  BPH.  Plan.  Patient satisfied with his voiding pattern on no BPH medicines    Physical exam reveals reveals a well-developed  well-nourished patient  in no acute distress.  Patient is alert and oriented ×3 with normal mood and affect.  Respiratory effort is normal and there is no peripheral edema.  Skin is normal to inspection and palpation.Penis/perineum without lesions, scrotum without rash/cysts, epididymis nontender bilaterally, urethral meatus in normal location normal size, no penile plaques palpated.   Testes equal in size without masses    /73 (BP Location: Right arm, Patient Position: Sitting, BP Method: Large (Automatic))   Pulse 61   Ht 6' (1.829 m)   Wt 122.9 kg (271 lb)   BMI 36.75 kg/m²   Review of Systems  General ROS: negative for chills, fever or weight loss  Respiratory ROS: no cough, shortness of breath, or wheezing  Cardiovascular ROS: no chest pain or dyspnea on exertion  Musculoskeletal ROS: negative for gait disturbance or muscular weakness    Family History   Problem Relation Age of Onset    Prostate cancer Neg Hx     Kidney disease Neg Hx      Past Medical History:   Diagnosis Date    Diabetes     HTN (hypertension)      Family History   Problem Relation Age of Onset    Prostate cancer Neg Hx     Kidney disease Neg Hx      Social History   Substance Use Topics    Smoking status: Never Smoker    Smokeless tobacco: Never Used    Alcohol use No       Impression:      ICD-10-CM ICD-9-CM    1. Gross hematuria R31.0 599.71 Cystoscopy      POCT URINE DIPSTICK WITHOUT MICROSCOPE   2. BPH with obstruction/lower urinary tract symptoms N40.1 600.01     N13.8 599.69

## 2018-04-23 ENCOUNTER — OFFICE VISIT (OUTPATIENT)
Dept: NEUROLOGY | Facility: HOSPITAL | Age: 68
End: 2018-04-23
Attending: SURGERY
Payer: MEDICARE

## 2018-04-23 VITALS
SYSTOLIC BLOOD PRESSURE: 150 MMHG | DIASTOLIC BLOOD PRESSURE: 86 MMHG | BODY MASS INDEX: 37.79 KG/M2 | HEART RATE: 61 BPM | TEMPERATURE: 98 F | WEIGHT: 279 LBS | HEIGHT: 72 IN

## 2018-04-23 DIAGNOSIS — Z09 POSTOP CHECK: Primary | ICD-10-CM

## 2018-04-23 PROCEDURE — 99213 OFFICE O/P EST LOW 20 MIN: CPT | Performed by: SURGERY

## 2018-04-23 NOTE — PROGRESS NOTES
S/p  I and d of peritectal; abscess    Doing well  No more leakge   No pain  Having normal Bm    Eating well    On exam    Surgery site healed, no tenderness or erythema  Doing well    F/u with me PRN    [facility]    If any urgent issues with perianal abscess, I can see him in clinic or hospital

## 2018-05-23 ENCOUNTER — LAB VISIT (OUTPATIENT)
Dept: LAB | Facility: HOSPITAL | Age: 68
End: 2018-05-23
Attending: UROLOGY
Payer: MEDICARE

## 2018-05-23 ENCOUNTER — OFFICE VISIT (OUTPATIENT)
Dept: UROLOGY | Facility: CLINIC | Age: 68
End: 2018-05-23
Payer: MEDICARE

## 2018-05-23 ENCOUNTER — TELEPHONE (OUTPATIENT)
Dept: UROLOGY | Facility: CLINIC | Age: 68
End: 2018-05-23

## 2018-05-23 VITALS
SYSTOLIC BLOOD PRESSURE: 148 MMHG | WEIGHT: 278 LBS | DIASTOLIC BLOOD PRESSURE: 79 MMHG | BODY MASS INDEX: 37.65 KG/M2 | HEIGHT: 72 IN | HEART RATE: 66 BPM

## 2018-05-23 DIAGNOSIS — R31.0 GROSS HEMATURIA: Primary | ICD-10-CM

## 2018-05-23 DIAGNOSIS — E27.8 ADRENAL INCIDENTALOMA: ICD-10-CM

## 2018-05-23 DIAGNOSIS — Z12.5 PROSTATE CANCER SCREENING: ICD-10-CM

## 2018-05-23 DIAGNOSIS — I10 ESSENTIAL HYPERTENSION: ICD-10-CM

## 2018-05-23 DIAGNOSIS — E27.8 ADRENAL MASS: ICD-10-CM

## 2018-05-23 LAB
ANION GAP SERPL CALC-SCNC: 6 MMOL/L
BILIRUB SERPL-MCNC: ABNORMAL MG/DL
BLOOD URINE, POC: ABNORMAL
BUN SERPL-MCNC: 10 MG/DL
CALCIUM SERPL-MCNC: 9.5 MG/DL
CHLORIDE SERPL-SCNC: 108 MMOL/L
CO2 SERPL-SCNC: 26 MMOL/L
COLOR, POC UA: YELLOW
COMPLEXED PSA SERPL-MCNC: 14.9 NG/ML
CREAT SERPL-MCNC: 0.8 MG/DL
EST. GFR  (AFRICAN AMERICAN): >60 ML/MIN/1.73 M^2
EST. GFR  (NON AFRICAN AMERICAN): >60 ML/MIN/1.73 M^2
GLUCOSE SERPL-MCNC: 115 MG/DL
GLUCOSE UR QL STRIP: NORMAL
KETONES UR QL STRIP: ABNORMAL
LEUKOCYTE ESTERASE URINE, POC: ABNORMAL
NITRITE, POC UA: ABNORMAL
PH, POC UA: 5
POTASSIUM SERPL-SCNC: 4.2 MMOL/L
PROTEIN, POC: ABNORMAL
SODIUM SERPL-SCNC: 140 MMOL/L
SPECIFIC GRAVITY, POC UA: 1.03
UROBILINOGEN, POC UA: NORMAL

## 2018-05-23 PROCEDURE — 99999 PR PBB SHADOW E&M-EST. PATIENT-LVL III: CPT | Mod: PBBFAC,,, | Performed by: UROLOGY

## 2018-05-23 PROCEDURE — 99213 OFFICE O/P EST LOW 20 MIN: CPT | Mod: PBBFAC,PO | Performed by: UROLOGY

## 2018-05-23 PROCEDURE — 81002 URINALYSIS NONAUTO W/O SCOPE: CPT | Mod: PBBFAC,PO | Performed by: UROLOGY

## 2018-05-23 PROCEDURE — 36415 COLL VENOUS BLD VENIPUNCTURE: CPT

## 2018-05-23 PROCEDURE — 84153 ASSAY OF PSA TOTAL: CPT

## 2018-05-23 PROCEDURE — 99214 OFFICE O/P EST MOD 30 MIN: CPT | Mod: S$PBB,,, | Performed by: UROLOGY

## 2018-05-23 PROCEDURE — 80048 BASIC METABOLIC PNL TOTAL CA: CPT

## 2018-05-23 NOTE — PROGRESS NOTES
This patient was last seen by me last month at which time he had negative cystoscopy for evaluation gross hematuria.    Patient now comes in follow-up where [facility].  This shows 2.9 cm adrenal incidentaloma for which further evaluation recommended    Patient reports no gross hematuria and has a strong stream with no straining to void    [facility] the [facility] did not show a recent PSA    Physical exam reveals reveals a well-developed well-nourished patient  in no acute distress.  Patient is alert and oriented ×3 with normal mood and affect.  Respiratory effort is normal and there is no peripheral edema.  Skin is normal to inspection and palpation    BP (!) 148/79 (BP Location: Left arm, Patient Position: Sitting, BP Method: Large (Automatic))   Pulse 66   Ht 6' (1.829 m)   Wt 126.1 kg (278 lb)   BMI 37.70 kg/m²   Review of Systems  General ROS: negative for chills, fever or weight loss  Respiratory ROS: no cough, shortness of breath, or wheezing  Cardiovascular ROS: no chest pain or dyspnea on exertion  Musculoskeletal ROS: negative for gait disturbance or muscular weakness    Family History   Problem Relation Age of Onset    Prostate cancer Neg Hx     Kidney disease Neg Hx      Past Medical History:   Diagnosis Date    Diabetes     HTN (hypertension)      Family History   Problem Relation Age of Onset    Prostate cancer Neg Hx     Kidney disease Neg Hx      Social History   Substance Use Topics    Smoking status: Never Smoker    Smokeless tobacco: Never Used    Alcohol use No       Impression:      ICD-10-CM ICD-9-CM    1. Gross hematuria R31.0 599.71 POCT URINE DIPSTICK WITHOUT MICROSCOPE   2. Adrenal incidentaloma E27.8 255.8 Basic metabolic panel   3. Prostate cancer screening Z12.5 V76.44 PSA, Screening   4. Essential hypertension I10 401.9    5. Adnexal mass N94.9 625.8 CT Abdomen With Without Contrast       Plan:    #1 .  Gross hematuria.  Plan.  Patient has had  recent negative workup.  No further evaluation needed unless he develops recurrent gross hematuria    2.  Adrenal incidentaloma.  Plan.  Discussed need for further evaluation.  [facility] or patient states that he wants to have it done here.  Will obtain adrenal CT scan.  Patient instructed to stop metformin 1 day prior to CT scan and obtaine a basic metabolic panel blood test 2 days after the CT scan.  The patient is  instructed to call the office  after the blood test to get the okay to restart  metformin.  Metformin instruction sheet given.  Follow-up soon thereafter to review results    3.  Prostate cancer screening.  I discussed risks and benefits and controversy concerning prostate cancer screening.  Patient voices understanding and wants to have it done.  Will check PSA today    4.   Elevated blood pressure.  Plan.  This finding pointed out to the patient.  I recommend that the patient follow up with the patient's PCP for this.    5.  Adrenal mass.  As per 2.    Today I spent 25 minutes with the patient, more than 50% of which was spent counseling and coordinating care concerning treatment of issues as detailed above.    PLEASE NOTE:  Please be advised that portions of this note were dictated using voice recognition software and may contain dictation related errors in spelling/grammar/appropriate pronouns/syntax or other errors that might have not been found and or corrected on text review.

## 2018-05-23 NOTE — TELEPHONE ENCOUNTER
Left message for pt.to call office for test results and to schedule an appt. Will try again later.

## 2018-05-23 NOTE — TELEPHONE ENCOUNTER
----- Message from Mary Oh sent at 5/23/2018  2:56 PM CDT -----  Contact: self / [phone]  Patient is returning a call from your office. Please advise

## 2018-05-23 NOTE — TELEPHONE ENCOUNTER
----- Message from Win Rodriguez MD sent at 5/23/2018  1:50 PM CDT -----  Please contact the patient and let him know that PSA blood test came back elevated.  Please schedule appointment in 1-2 weeks so that I can discuss result with patient and determine a plan of management

## 2018-06-05 ENCOUNTER — HOSPITAL ENCOUNTER (OUTPATIENT)
Dept: RADIOLOGY | Facility: HOSPITAL | Age: 68
Discharge: HOME | End: 2018-06-05
Attending: UROLOGY
Payer: MEDICARE

## 2018-06-05 DIAGNOSIS — E27.8 ADRENAL MASS: ICD-10-CM

## 2018-06-05 PROCEDURE — 74170 CT ABD WO CNTRST FLWD CNTRST: CPT | Mod: TC

## 2018-06-05 PROCEDURE — 74170 CT ABD WO CNTRST FLWD CNTRST: CPT | Mod: 26,,, | Performed by: RADIOLOGY

## 2018-06-05 PROCEDURE — 25500020 PHARM REV CODE 255: Performed by: UROLOGY

## 2018-06-05 RX ADMIN — IOHEXOL 100 ML: 350 INJECTION, SOLUTION INTRAVENOUS at 11:06

## 2018-06-05 RX ADMIN — IOHEXOL 30 ML: 350 INJECTION, SOLUTION INTRAVENOUS at 09:06

## 2018-07-05 ENCOUNTER — OFFICE VISIT (OUTPATIENT)
Dept: UROLOGY | Facility: CLINIC | Age: 68
End: 2018-07-05
Payer: MEDICARE

## 2018-07-05 DIAGNOSIS — R31.0 GROSS HEMATURIA: Primary | ICD-10-CM

## 2018-07-05 DIAGNOSIS — D35.00 ADRENAL ADENOMA, UNSPECIFIED LATERALITY: ICD-10-CM

## 2018-07-05 DIAGNOSIS — R97.20 ELEVATED PSA: ICD-10-CM

## 2018-07-05 PROCEDURE — 81002 URINALYSIS NONAUTO W/O SCOPE: CPT | Mod: PBBFAC,PO | Performed by: UROLOGY

## 2018-07-05 PROCEDURE — 99214 OFFICE O/P EST MOD 30 MIN: CPT | Mod: S$PBB,,, | Performed by: UROLOGY

## 2018-07-05 PROCEDURE — 99999 PR PBB SHADOW E&M-EST. PATIENT-LVL II: CPT | Mod: PBBFAC,,, | Performed by: UROLOGY

## 2018-07-05 PROCEDURE — 99212 OFFICE O/P EST SF 10 MIN: CPT | Mod: PBBFAC,PO | Performed by: UROLOGY

## 2018-07-05 NOTE — PROGRESS NOTES
This patient was last seen by me May of this year at which time he has a history of a negative workup for gross hematuria    Screening PSA was obtained came back 14.9.  Patient status post drainage of perirectal abscess several months ago.    He also has a history of adrenal adenoma on outside CT for which follow-up was recommended.  CT scan obtain which is consistent with adrenal adenoma    Patient reports no gross hematuria    Physical exam reveals reveals a well-developed well-nourished patient  in no acute distress.  Patient is alert and oriented ×3 with normal mood and affect.  Respiratory effort is normal and there is no peripheral edema.  Skin is normal to inspection and palpation.  Penis/perineum without lesions, scrotum without rash/cysts, epididymis nontender bilaterally, urethral meatus in normal location normal size, no penile plaques palpated, prostate:  Smooth enlarged and benign-feeling, seminal vesicles not palpated.  No rectal masses, sphincter tone normal.  Testes equal in size without masses    There were no vitals taken for this visit.  Review of Systems  General ROS: negative for chills, fever or weight loss  Respiratory ROS: no cough, shortness of breath, or wheezing  Cardiovascular ROS: no chest pain or dyspnea on exertion  Musculoskeletal ROS: negative for gait disturbance or muscular weakness    Family History   Problem Relation Age of Onset    Prostate cancer Neg Hx     Kidney disease Neg Hx      Past Medical History:   Diagnosis Date    Diabetes     HTN (hypertension)      Family History   Problem Relation Age of Onset    Prostate cancer Neg Hx     Kidney disease Neg Hx      Social History   Substance Use Topics    Smoking status: Never Smoker    Smokeless tobacco: Never Used    Alcohol use No       Impression:      ICD-10-CM ICD-9-CM    1. Gross hematuria R31.0 599.71 Urine culture   2. Elevated PSA R97.20 790.93 Prostate Specific Antigen, Diagnostic   3. Adrenal adenoma,  unspecified laterality D35.00 227.0        Plan:    #1 1.  Gross hematuria, history of.  Patient has had recent negative workup.  No recurrence of gross hematuria.  Patient's urine will be cultured and once results are available ,we will contact the patient if antibiotics are indicated.    2..  Elevated PSA.  Plan.  I discussed at length in detail with the patient possibility presence of prostate cancer.  I discussed options of management including serial PSAs versus proceeding directly to prostate biopsy.  All questions were answered and patient voices understanding of his options.  Given the fact the patient has had recent drainage of perirectal abscess, discussed possibility that elevated PSA was related to adjacent inflammation.  It is my recommendation that we recheck PSA in 2 months and it still elevated, then proceed with prostate biopsy.  Patient voiced understanding agrees with this plan.  Follow-up 2 months with recheck PSA immediately prior    3.  Adrenal adenoma.  Plan.  Benign entity no further follow-up needed    Today I spent 25 minutes with the patient, more than 50% of which was spent counseling and coordinating care concerning treatment of issues as detailed above.    PLEASE NOTE:  Please be advised that portions of this note were dictated using voice recognition software and may contain dictation related errors in spelling/grammar/appropriate pronouns/syntax or other errors that might have not been found and or corrected on text review.

## 2018-07-16 ENCOUNTER — TELEPHONE (OUTPATIENT)
Dept: UROLOGY | Facility: CLINIC | Age: 68
End: 2018-07-16

## 2018-07-16 NOTE — TELEPHONE ENCOUNTER
----- Message from Darek Haas sent at 7/16/2018  7:57 AM CDT -----  Contact: self/[phone]  He is returning a call to the nurse but the earliest that he will be able to come in is what he already has scheduled.

## 2018-07-16 NOTE — TELEPHONE ENCOUNTER
----- Message from Darek Haas sent at 7/16/2018  7:57 AM CDT -----  Contact: self/[phone]  He is returning a call to the nurse but the earliest that he will be able to come in is what he already has scheduled.

## 2018-07-18 VITALS — HEART RATE: 54 BPM | DIASTOLIC BLOOD PRESSURE: 99 MMHG | SYSTOLIC BLOOD PRESSURE: 164 MMHG

## 2018-07-18 LAB
BILIRUB SERPL-MCNC: NORMAL MG/DL
BLOOD URINE, POC: NORMAL
COLOR, POC UA: YELLOW
GLUCOSE UR QL STRIP: NORMAL
KETONES UR QL STRIP: NORMAL
LEUKOCYTE ESTERASE URINE, POC: NORMAL
NITRITE, POC UA: NORMAL
PH, POC UA: 5
PROTEIN, POC: NORMAL
SPECIFIC GRAVITY, POC UA: 1.03
UROBILINOGEN, POC UA: NORMAL

## 2018-08-20 ENCOUNTER — HOSPITAL ENCOUNTER (EMERGENCY)
Facility: OTHER | Age: 68
Discharge: HOME | End: 2018-08-20
Attending: EMERGENCY MEDICINE
Payer: MEDICARE

## 2018-08-20 ENCOUNTER — TELEPHONE (OUTPATIENT)
Dept: UROLOGY | Facility: CLINIC | Age: 68
End: 2018-08-20

## 2018-08-20 VITALS
BODY MASS INDEX: 33.86 KG/M2 | HEART RATE: 58 BPM | RESPIRATION RATE: 20 BRPM | HEIGHT: 72 IN | WEIGHT: 250 LBS | DIASTOLIC BLOOD PRESSURE: 77 MMHG | TEMPERATURE: 99 F | SYSTOLIC BLOOD PRESSURE: 133 MMHG | OXYGEN SATURATION: 100 %

## 2018-08-20 DIAGNOSIS — K60.4 PERIRECTAL FISTULA: Primary | ICD-10-CM

## 2018-08-20 LAB
ABO + RH BLD: NORMAL
ALBUMIN SERPL BCP-MCNC: 3.8 G/DL
ALP SERPL-CCNC: 66 U/L
ALT SERPL W/O P-5'-P-CCNC: 20 U/L
ANION GAP SERPL CALC-SCNC: 8 MMOL/L
ANISOCYTOSIS BLD QL SMEAR: SLIGHT
AST SERPL-CCNC: 15 U/L
BASOPHILS # BLD AUTO: 0.03 K/UL
BASOPHILS NFR BLD: 0.5 %
BILIRUB SERPL-MCNC: 0.6 MG/DL
BLD GP AB SCN CELLS X3 SERPL QL: NORMAL
BUN SERPL-MCNC: 15 MG/DL
CALCIUM SERPL-MCNC: 9.4 MG/DL
CHLORIDE SERPL-SCNC: 108 MMOL/L
CO2 SERPL-SCNC: 24 MMOL/L
CREAT SERPL-MCNC: 0.9 MG/DL
DIFFERENTIAL METHOD: ABNORMAL
EOSINOPHIL # BLD AUTO: 0.1 K/UL
EOSINOPHIL NFR BLD: 2 %
ERYTHROCYTE [DISTWIDTH] IN BLOOD BY AUTOMATED COUNT: 15 %
EST. GFR  (AFRICAN AMERICAN): >60 ML/MIN/1.73 M^2
EST. GFR  (NON AFRICAN AMERICAN): >60 ML/MIN/1.73 M^2
GIANT PLATELETS BLD QL SMEAR: PRESENT
GLUCOSE SERPL-MCNC: 87 MG/DL
HCT VFR BLD AUTO: 39.3 %
HGB BLD-MCNC: 12.6 G/DL
INR PPP: 0.9
LYMPHOCYTES # BLD AUTO: 1.2 K/UL
LYMPHOCYTES NFR BLD: 19.9 %
MCH RBC QN AUTO: 23.4 PG
MCHC RBC AUTO-ENTMCNC: 32.1 G/DL
MCV RBC AUTO: 73 FL
MONOCYTES # BLD AUTO: 0.5 K/UL
MONOCYTES NFR BLD: 8.1 %
NEUTROPHILS # BLD AUTO: 4.2 K/UL
NEUTROPHILS NFR BLD: 69.5 %
PLATELET # BLD AUTO: 220 K/UL
PLATELET BLD QL SMEAR: ABNORMAL
PMV BLD AUTO: 10 FL
POIKILOCYTOSIS BLD QL SMEAR: SLIGHT
POTASSIUM SERPL-SCNC: 3.8 MMOL/L
PROT SERPL-MCNC: 7.7 G/DL
PROTHROMBIN TIME: 10.3 SEC
RBC # BLD AUTO: 5.39 M/UL
SCHISTOCYTES BLD QL SMEAR: ABNORMAL
SODIUM SERPL-SCNC: 140 MMOL/L
WBC # BLD AUTO: 6.08 K/UL

## 2018-08-20 PROCEDURE — 80053 COMPREHEN METABOLIC PANEL: CPT

## 2018-08-20 PROCEDURE — 85025 COMPLETE CBC W/AUTO DIFF WBC: CPT

## 2018-08-20 PROCEDURE — 85610 PROTHROMBIN TIME: CPT

## 2018-08-20 PROCEDURE — 36415 COLL VENOUS BLD VENIPUNCTURE: CPT

## 2018-08-20 PROCEDURE — 99285 EMERGENCY DEPT VISIT HI MDM: CPT | Mod: 25

## 2018-08-20 PROCEDURE — 25500020 PHARM REV CODE 255: Performed by: EMERGENCY MEDICINE

## 2018-08-20 PROCEDURE — 25000003 PHARM REV CODE 250: Performed by: EMERGENCY MEDICINE

## 2018-08-20 PROCEDURE — 86850 RBC ANTIBODY SCREEN: CPT

## 2018-08-20 RX ORDER — METRONIDAZOLE 500 MG/1
500 TABLET ORAL EVERY 12 HOURS
Qty: 14 TABLET | Refills: 0 | Status: SHIPPED | OUTPATIENT
Start: 2018-08-20 | End: 2018-08-27

## 2018-08-20 RX ORDER — METRONIDAZOLE 500 MG/1
500 TABLET ORAL
Status: COMPLETED | OUTPATIENT
Start: 2018-08-20 | End: 2018-08-20

## 2018-08-20 RX ORDER — GABAPENTIN 300 MG/1
300 CAPSULE ORAL 3 TIMES DAILY
COMMUNITY

## 2018-08-20 RX ORDER — DIAZEPAM 2 MG/1
2 TABLET ORAL EVERY 6 HOURS PRN
COMMUNITY

## 2018-08-20 RX ORDER — IBUPROFEN 600 MG/1
600 TABLET ORAL EVERY 6 HOURS PRN
COMMUNITY

## 2018-08-20 RX ORDER — ASPIRIN 81 MG/1
81 TABLET ORAL DAILY
COMMUNITY

## 2018-08-20 RX ORDER — METHYLPREDNISOLONE 4 MG/1
4 TABLET ORAL
COMMUNITY
End: 2018-08-22

## 2018-08-20 RX ADMIN — METRONIDAZOLE 500 MG: 500 TABLET ORAL at 06:08

## 2018-08-20 RX ADMIN — IOHEXOL 100 ML: 350 INJECTION, SOLUTION INTRAVENOUS at 05:08

## 2018-08-20 NOTE — ED TRIAGE NOTES
Pt presents with rectal bleeding, pt states it is black in color that started this morning. Pt denies n/v. Pt reports dizziness, recently dx with vertigo and mild weakness. Pt denies SOB, chest pain, or LOC. AAOx4, RR even and unlabored, NAD noted. Skin warm, dry, and intact. No swelling or edema to the extremities. Pt placed on cardiac, bp, and pulse ox monitors. Bed locked and in lowest position, side rails up x2, and call light within reach.

## 2018-08-20 NOTE — DISCHARGE INSTRUCTIONS
Take a sitz bath 3-4 times a day.  Follow up with colon and rectal surgery clinic - call in the morning to schedule.  Return to ER at any time for new or worsening symptoms.

## 2018-08-20 NOTE — TELEPHONE ENCOUNTER
----- Message from Germaine Mims sent at 8/20/2018  1:18 PM CDT -----  Contact: [phone]/self  Patient requesting to speak with you concerning rectal bleeding. Please call and advise.

## 2018-08-20 NOTE — ED PROVIDER NOTES
"Encounter Date: 8/20/2018    SCRIBE #1 NOTE: I, Daria Gonsalez, am scribing for, and in the presence of, Dr. Kyle.       History     Chief Complaint   Patient presents with    Rectal Bleeding     pt with rectal bleeding starting this am and dizzinees for several weeks.      Time seen by provider: 3:24 PM    This is a 68 y.o. male, with history of HTN and DM, who presents with complaint of rectal bleeding this morning. He felt a "pressure" before passing dark red/black blood. Episode is not similar to past bright red bleeding due to hemorrhoids. He denies having a bowel movement today. He reports two episodes of diarrhea without blood yesterday and rectal "irritation" for a few days that feels similar to rectal abscess he had five months ago. He also reports dizziness for several weeks and was recently diagnosed with vertigo. He denies fever, chest pain, abdominal pain, nausea, vomiting, leg swelling, or any urinary symptoms. He denies use of tobacco, alcohol, or illicit drugs.      The history is provided by the patient.     Review of patient's allergies indicates:  No Known Allergies  Past Medical History:   Diagnosis Date    Diabetes     HTN (hypertension)      Past Surgical History:   Procedure Laterality Date    INCISION AND DRAINAGE PERIRECTAL ABSCESS  03/2018     Family History   Problem Relation Age of Onset    Prostate cancer Neg Hx     Kidney disease Neg Hx      Social History     Tobacco Use    Smoking status: Never Smoker    Smokeless tobacco: Never Used   Substance Use Topics    Alcohol use: No    Drug use: No     Review of Systems   Constitutional: Negative for chills and fever.   HENT: Negative for congestion and sore throat.    Eyes: Negative for visual disturbance.   Respiratory: Negative for cough and shortness of breath.    Cardiovascular: Negative for chest pain, palpitations and leg swelling.   Gastrointestinal: Positive for anal bleeding, diarrhea and rectal pain. Negative for " abdominal distention, abdominal pain, constipation, nausea and vomiting.   Genitourinary: Negative for decreased urine volume, difficulty urinating, dysuria, enuresis, frequency, hematuria and urgency.   Musculoskeletal: Negative for back pain, joint swelling, neck pain and neck stiffness.   Skin: Negative for rash and wound.   Neurological: Positive for dizziness. Negative for weakness, numbness and headaches.   Psychiatric/Behavioral: Negative for behavioral problems and confusion.       Physical Exam     Initial Vitals [08/20/18 1509]   BP Pulse Resp Temp SpO2   133/76 (!) 55 20 97.9 °F (36.6 °C) 98 %      MAP       --         Physical Exam    Nursing note and vitals reviewed.  Constitutional: He appears well-developed and well-nourished. He is not diaphoretic. No distress.   HENT:   Head: Normocephalic and atraumatic.   Mouth/Throat: Oropharynx is clear and moist and mucous membranes are normal.   Eyes: Conjunctivae and EOM are normal. Pupils are equal, round, and reactive to light. No scleral icterus.   Neck: Normal range of motion. Neck supple.   Cardiovascular: Regular rhythm and normal heart sounds. Exam reveals no gallop and no friction rub.    No murmur heard.  Mild bradycardia.   Pulmonary/Chest: Breath sounds normal. No respiratory distress. He has no wheezes. He has no rhonchi. He has no rales.   Abdominal: Soft. Bowel sounds are normal. He exhibits no distension. There is no tenderness. There is no rebound and no guarding.   Genitourinary:   Genitourinary Comments: 1 mm opening at approximately 7 o'clock on external anal area with moderate spontaneous drainage of purulent sanguinous material. Only mild TTP with internal rectal exam. No internal or external hemorrhoids.   Musculoskeletal: Normal range of motion. He exhibits no edema or tenderness.   Neurological: He is alert and oriented to person, place, and time.   Skin: Skin is warm and dry. No rash noted. No erythema. No pallor.         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      Electronically signed by: Jarret Freedman MD  Date:    08/20/2018  Time:    17:25             Narrative:    EXAMINATION:  CT PELVIS WITH  CONTRAST    CLINICAL HISTORY:  rectal abscess;    FINDINGS:  Patient was administered 100 cc of Omnipaque 350.  Appendix is normal.  Bowel loops are unremarkable.  There is left gluteal ulceration and a possible fistulous tract to the rectum.  An actual abscess collection is not seen.  No fluid collections are seen.  No abnormal enhancement seen.  Bones reveal DJD.                                 Medical Decision Making:   Clinical Tests:   Lab Tests: Ordered and Reviewed  Radiological Study: Ordered and Reviewed  ED Management:  5:38 PM - Paged Dr. Stringer  6:01 PM - Discussed case with Dr. Stringer of general surgery. He recommends flagyl, sitz baths, and follow up with colorectal surgery. No need for admission.    Emergent evaluation of 68-year-old male with complaint of rectal bleeding, history of recent rectal abscess.  Vital signs are benign, afebrile.  Physical exam revealed an area of spontaneous drainage at the anal verge.  CT scan shows a fistulous tract, but no abscess or drainable fluid collection.  Labs are reassuring with no elevated white count, stable H&H.  I discussed the case with General surgery who states this is not uncommon after a rectal abscess, and the patient can follow up  with Colorectal surgery.  He also recommended using Flagyl in Sitz baths.  Patient was given his 1st dose of Flagyl here and is discharged in good condition.  He is given contact information to schedule with [facility] tomorrow.  He is encouraged to return here for new or worsening symptoms such as fever or increasing pain.            Scribe Attestation:   Scribe #1: I performed the above scribed service and the documentation accurately describes the services I performed. I attest to the accuracy of the note.    Attending Attestation:           Physician Attestation for Scribe:  Physician Attestation Statement for Scribe #1: I, Dr. Kyle, reviewed documentation, as scribed by Daria Gonsalez in my presence, and it is both accurate and complete.                 ED Course as of Aug 20 1847   Mon Aug 20, 2018   1734 Paged general surgery to discuss the case.  [AK]      ED Course User Index  [AK] Emely Kyle MD     Clinical Impression:     1. Perirectal fistula                                 Emely Kyle MD  08/20/18 2317

## 2018-08-20 NOTE — ED NOTES
MD at bedside for rectal exam. Pt has rectal wound 0.5cm puncture with serosanguinous drainage. Pt tolerated exam well.

## 2018-08-20 NOTE — TELEPHONE ENCOUNTER
"Returned call, spoke with patient.  Approximately 1030 hours patient had a BM with dark, black stool.  He was prescribed Ibuprofen 600 mg due to left leg pain, diagnosed with sciatic nerve pains by the [facility] clinic x 2 weeks ago and has been taking NSAIDS since.  Complains of irritation, cramping to abdominal area.  Advised to go to nearest ED.   Patient also mentioned he had a [facility] and [facility], patient came to [facility] for treatment 3/2018, I&D done by Dr Munguia.  Patient continued to converse with me trying to figure out why he could be bleeding, advised patient he needed medical attention and this is not something he could "put off".  Voiced understanding.   "

## 2018-08-20 NOTE — ED NOTES
Rounding completed on pt. Pt reporting 4/10 pain at this time. Dr. Kyle discussing case with Dr. Stringer. Pt requesting something to eat. Bed in lowest, locked position, side rails up x 2. Call light within reach.

## 2018-08-22 ENCOUNTER — OFFICE VISIT (OUTPATIENT)
Dept: SURGERY | Facility: CLINIC | Age: 68
End: 2018-08-22
Payer: MEDICARE

## 2018-08-22 VITALS
SYSTOLIC BLOOD PRESSURE: 147 MMHG | BODY MASS INDEX: 33.56 KG/M2 | HEART RATE: 54 BPM | WEIGHT: 247.81 LBS | DIASTOLIC BLOOD PRESSURE: 93 MMHG | HEIGHT: 72 IN

## 2018-08-22 DIAGNOSIS — K60.3 TRANSSPHINCTERIC ANAL FISTULA: ICD-10-CM

## 2018-08-22 DIAGNOSIS — K60.4 RECTAL FISTULA: Primary | ICD-10-CM

## 2018-08-22 PROCEDURE — 99213 OFFICE O/P EST LOW 20 MIN: CPT | Mod: PBBFAC | Performed by: COLON & RECTAL SURGERY

## 2018-08-22 PROCEDURE — 99203 OFFICE O/P NEW LOW 30 MIN: CPT | Mod: S$PBB,,, | Performed by: COLON & RECTAL SURGERY

## 2018-08-22 PROCEDURE — 99999 PR PBB SHADOW E&M-EST. PATIENT-LVL III: CPT | Mod: PBBFAC,,, | Performed by: COLON & RECTAL SURGERY

## 2018-08-22 NOTE — PROGRESS NOTES
[facility] Office Visit History and Physical    Referring Md:   Aaareferral Self  No address on file    SUBJECTIVE:     Chief Complaint:  Perirectal drainage, fistula    History of Present Illness:  Patient is a 68 y.o. male presents with history of perirectal abscess which required incision and drainage in March of this year.  He had a drain placed which eventually was removed as an outpatient.  According to the patient the Wound healed and stop draining.  He developed some irritation in the wound and began bleeding heavily on Monday.  He is seen in the emergency department.  It appeared that the wound had reopened and was draining.  It is felt that this most likely represented an anal fistula.  He denies any severe pain.  He has had some mild swelling. He denies any fever.        Past Medical History:   Diagnosis Date    Diabetes     HTN (hypertension)        Past Surgical History:   Procedure Laterality Date    INCISION AND DRAINAGE PERIRECTAL ABSCESS  03/2018       Review of patient's allergies indicates:  No Known Allergies    Current Outpatient Medications on File Prior to Visit   Medication Sig Dispense Refill    aspirin (ECOTRIN) 81 MG EC tablet Take 81 mg by mouth once daily.      atorvastatin (LIPITOR) 20 MG tablet Take 20 mg by mouth once daily.      diazePAM (VALIUM) 2 MG tablet Take 2 mg by mouth every 6 (six) hours as needed for Anxiety.      dorzolamide HCl/timolol maleat (DORZOLAMIDE-TIMOLOL OPHT) Apply to eye.      gabapentin (NEURONTIN) 300 MG capsule Take 300 mg by mouth 3 (three) times daily.      ibuprofen (ADVIL,MOTRIN) 600 MG tablet Take 600 mg by mouth every 6 (six) hours as needed for Pain.      lisinopril 10 MG tablet Take 40 mg by mouth once daily.       metFORMIN (GLUCOPHAGE) 500 MG tablet Take 500 mg by mouth 2 (two) times daily with meals.      metroNIDAZOLE (FLAGYL) 500 MG tablet Take 1 tablet (500 mg total) by mouth every 12 (twelve) hours. for 7 days 14 tablet 0     [DISCONTINUED] methylPREDNISolone (MEDROL DOSEPACK) 4 mg tablet Take 4 mg by mouth. use as directed      [DISCONTINUED] oxyCODONE-acetaminophen (PERCOCET) 5-325 mg per tablet Take 1 tablet by mouth every 6 (six) hours as needed for Pain. 30 tablet 0     No current facility-administered medications on file prior to visit.        Family History   Problem Relation Age of Onset    Prostate cancer Neg Hx     Kidney disease Neg Hx        Social History     Tobacco Use    Smoking status: Never Smoker    Smokeless tobacco: Never Used   Substance Use Topics    Alcohol use: No    Drug use: No        Review of Systems:  ROS:  GENERAL: No fever, chills, fatigability or weight loss.  Integument:  No rashes, redness, icterus  CHEST: Denies PAYNE, cyanosis, wheezing, cough and sputum production.  CARDIOVASCULAR: Denies chest pain, PND, orthopnea or reduced exercise tolerance.  GI:  Denies abd pain, dysphagia, nausea, vomiting, no hematemesis, no rectal pain  : Denies burning on urination, no hematuria, no bacteriuria  MSK:  No deformities, swelling, joint pain swelling  Neurologic:  No HAs, seizures, weakness, paresthesias, gait problems      OBJECTIVE:     Vital Signs (Most Recent)  BP (!) 147/93 (BP Location: Left arm, Patient Position: Sitting, BP Method: Large (Automatic))   Pulse (!) 54   Ht 6' (1.829 m)   Wt 112.4 kg (247 lb 12.8 oz)   BMI 33.61 kg/m²     Physical Exam:  General: Black or  male in no distress   Skin/ Sclera anicteric  HEENT: anicteric, normocephalic, extraocular movements intact   Neck trachea midline  Chest symmetric, nl excursions, no retractions  Respiratory: respirations are even and unlabored     Anorectal:   Inspection         External opening posterior midline. There is a cavity on probing.  It tracks towards the deep post anal space.  Extremities: Warm dry and intact.  NO CCE  Neuro: alert and oriented x 4.  Moves all extremities.         ASSESSMENT/PLAN:   Perirectal  fistula      Recommend  Check MRI  Return office 1 week to schedule Exam under anesthesia, possible seton insertion.

## 2018-08-23 ENCOUNTER — HOSPITAL ENCOUNTER (OUTPATIENT)
Dept: RADIOLOGY | Facility: HOSPITAL | Age: 68
Discharge: HOME | End: 2018-08-23
Attending: COLON & RECTAL SURGERY
Payer: MEDICARE

## 2018-08-23 DIAGNOSIS — K60.4 RECTAL FISTULA: ICD-10-CM

## 2018-08-23 PROCEDURE — 72197 MRI PELVIS W/O & W/DYE: CPT | Mod: TC

## 2018-08-23 PROCEDURE — 25500020 PHARM REV CODE 255: Performed by: COLON & RECTAL SURGERY

## 2018-08-23 PROCEDURE — A9585 GADOBUTROL INJECTION: HCPCS | Performed by: COLON & RECTAL SURGERY

## 2018-08-23 PROCEDURE — 72197 MRI PELVIS W/O & W/DYE: CPT | Mod: 26,,, | Performed by: RADIOLOGY

## 2018-08-23 RX ORDER — GADOBUTROL 604.72 MG/ML
10 INJECTION INTRAVENOUS
Status: COMPLETED | OUTPATIENT
Start: 2018-08-23 | End: 2018-08-23

## 2018-08-23 RX ADMIN — GADOBUTROL 10 ML: 604.72 INJECTION INTRAVENOUS at 12:08

## 2018-08-29 ENCOUNTER — OFFICE VISIT (OUTPATIENT)
Dept: SURGERY | Facility: CLINIC | Age: 68
End: 2018-08-29
Payer: MEDICARE

## 2018-08-29 ENCOUNTER — TELEPHONE (OUTPATIENT)
Dept: SURGERY | Facility: CLINIC | Age: 68
End: 2018-08-29

## 2018-08-29 ENCOUNTER — ANESTHESIA EVENT (OUTPATIENT)
Dept: SURGERY | Facility: HOSPITAL | Age: 68
End: 2018-08-29

## 2018-08-29 VITALS
SYSTOLIC BLOOD PRESSURE: 136 MMHG | BODY MASS INDEX: 33.56 KG/M2 | WEIGHT: 247.81 LBS | DIASTOLIC BLOOD PRESSURE: 80 MMHG | HEIGHT: 72 IN | HEART RATE: 52 BPM

## 2018-08-29 DIAGNOSIS — K60.3 ANAL FISTULA: Primary | ICD-10-CM

## 2018-08-29 DIAGNOSIS — K60.3 TRANSSPHINCTERIC ANAL FISTULA: ICD-10-CM

## 2018-08-29 PROCEDURE — 99999 PR PBB SHADOW E&M-EST. PATIENT-LVL IV: CPT | Mod: PBBFAC,,, | Performed by: COLON & RECTAL SURGERY

## 2018-08-29 PROCEDURE — 99214 OFFICE O/P EST MOD 30 MIN: CPT | Mod: PBBFAC | Performed by: COLON & RECTAL SURGERY

## 2018-08-29 PROCEDURE — 99213 OFFICE O/P EST LOW 20 MIN: CPT | Mod: S$PBB,,, | Performed by: COLON & RECTAL SURGERY

## 2018-08-29 NOTE — PROGRESS NOTES
HPI:  Reza Del Cid is a 68 y.o. 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  No new complaints.  No anal pain.  Minimal d/c.        Past Medical History:   Diagnosis Date    Diabetes     HTN (hypertension)         Past Surgical History:   Procedure Laterality Date    INCISION AND DRAINAGE PERIRECTAL ABSCESS  03/2018       Review of patient's allergies indicates:  No Known Allergies    Family History   Problem Relation Age of Onset    Prostate cancer Neg Hx     Kidney disease Neg Hx        Social History     Socioeconomic History    Marital status: Single     Spouse name: None    Number of children: None    Years of education: None    Highest education level: None   Social Needs    Financial resource strain: None    Food insecurity - worry: None    Food insecurity - inability: None    Transportation needs - medical: None    Transportation needs - non-medical: None   Occupational History    None   Tobacco Use    Smoking status: Never Smoker    Smokeless tobacco: Never Used   Substance and Sexual Activity    Alcohol use: No    Drug use: No    Sexual activity: No   Other Topics Concern    None   Social History Narrative    None       ROS:  GENERAL: No fever, chills, fatigability or weight loss.  Integument: No rashes, redness, icterus  CHEST: Denies PAYNE, cyanosis, wheezing, cough and sputum production.  CARDIOVASCULAR: Denies chest pain, PND, orthopnea or reduced exercise tolerance.  GI: Denies abd pain, dysphagia, nausea, vomiting, no hematemesis   : Denies burning on urination, no hematuria, no bacteriuria  MSK: No deformities, swelling, joint pain swelling  Neurologic: No HAs, seizures, weakness, paresthesias, gait problems    PE:  General appearance well  /80 (BP Location: Right arm, Patient Position: Sitting, BP Method: Large (Automatic))   Pulse (!) 52   Ht 6' (1.829 m)   Wt 112.4 kg (247 lb 12.8 oz)   BMI 33.61 kg/m²   Sclera/ Skin anicteric  AT NC EOMI  Neck supple  trachea midline   Chest symmetric, nl excursion, no retractions, breathing comfortably  EXT - no CCE  Neuro:  Mood/ affect nl, alert and oriented x 3, moves all ext's, gait nl    Rectal  Inspection       External opening posterior midline.  No fluctuance or tenderness.       MRI pelvis  Impression       Transphinteric fistula of the left posterolateral wall (5:00 position) which communicates with a small left gluteal collection with extension to the skin surface.    Simple, superficial fistula of the lower anal canal (right anterolateral 10:00 position) without transgression of the sphincters.    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  Recommend exam under anesthesia, drainage of fistula with non cutting seton.

## 2018-08-29 NOTE — TELEPHONE ENCOUNTER
----- Message from Jayne Saunders sent at 8/29/2018 12:29 PM CDT -----  Contact: Pt:[phone] or [phone]  .Needs Advice    Reason for call:Pt called and states he would like to reschedule his surgery that is scheduled for tomorrow.       Communication Preference:  Additional Information:

## 2018-08-30 ENCOUNTER — ANESTHESIA (OUTPATIENT)
Dept: SURGERY | Facility: HOSPITAL | Age: 68
End: 2018-08-30

## 2018-08-30 NOTE — ANESTHESIA PREPROCEDURE EVALUATION
[facility]  Anesthesia Pre-Operative Evaluation         Patient Name: Reza Del Cid  YOB: 1950  MRN: [id number]    SUBJECTIVE:     Pre-operative evaluation for Procedure(s) (LRB):  Exam under anesthesia (N/A)  FISTULOTOMY, RECTUM (N/A)  PLACEMENT, SETON STITCH (N/A)     08/29/2018    Reza Del Cid is a 68 y.o. male w/ a significant PMHx of hypertension, type 2 diabetes mellitus, hyperlipidemia, glaucoma, and anxiety who presents with drainage from a perirectal fistula.  Patient was seen by Dr. Gonzalez who performed an I&D of a perirectal abscess March 2018.      Patient now presents for the above procedure(s).      LDA: Peripheral IV    Prev airway:     3/2018:  Easy mask ventilation, Direct laryngoscopy with Gordillo #3 blade, Grade 1 view, 2 attempts, 7.5 ETT    Drips: Normal Saline.    Patient Active Problem List   Diagnosis    Gross hematuria    Transsphincteric anal fistula       Review of patient's allergies indicates:  No Known Allergies    Current Outpatient Medications:  No current facility-administered medications for this encounter.     Current Outpatient Medications:     aspirin (ECOTRIN) 81 MG EC tablet, Take 81 mg by mouth once daily., Disp: , Rfl:     atorvastatin (LIPITOR) 20 MG tablet, Take 20 mg by mouth once daily., Disp: , Rfl:     diazePAM (VALIUM) 2 MG tablet, Take 2 mg by mouth every 6 (six) hours as needed for Anxiety., Disp: , Rfl:     dorzolamide HCl/timolol maleat (DORZOLAMIDE-TIMOLOL OPHT), Apply to eye., Disp: , Rfl:     gabapentin (NEURONTIN) 300 MG capsule, Take 300 mg by mouth 3 (three) times daily., Disp: , Rfl:     ibuprofen (ADVIL,MOTRIN) 600 MG tablet, Take 600 mg by mouth every 6 (six) hours as needed for Pain., Disp: , Rfl:     lisinopril 10 MG tablet, Take 40 mg by mouth once daily. , Disp: , Rfl:     metFORMIN (GLUCOPHAGE) 500 MG tablet, Take 500 mg by mouth 2 (two) times daily with meals., Disp: , Rfl:      Past Surgical History:   Procedure Laterality Date    INCISION AND DRAINAGE PERIRECTAL ABSCESS  03/2018       Social History     Socioeconomic History    Marital status: Single     Spouse name: Not on file    Number of children: Not on file    Years of education: Not on file    Highest education level: Not on file   Social Needs    Financial resource strain: Not on file    Food insecurity - worry: Not on file    Food insecurity - inability: Not on file    Transportation needs - medical: Not on file    Transportation needs - non-medical: Not on file   Occupational History    Not on file   Tobacco Use    Smoking status: Never Smoker    Smokeless tobacco: Never Used   Substance and Sexual Activity    Alcohol use: No    Drug use: No    Sexual activity: No   Other Topics Concern    Not on file   Social History Narrative    Not on file       OBJECTIVE:     Vital Signs Range (Last 24H):  Pulse:  [52]   BP: (136)/(80)       Significant Labs:  Lab Results   Component Value Date    WBC 6.08 08/20/2018    HGB 12.6 (L) 08/20/2018    HCT 39.3 (L) 08/20/2018     08/20/2018    ALT 20 08/20/2018    AST 15 08/20/2018     08/20/2018    K 3.8 08/20/2018     08/20/2018    CREATININE 0.9 08/20/2018    BUN 15 08/20/2018    CO2 24 08/20/2018    PSA 14.9 (H) 05/23/2018    INR 0.9 08/20/2018       Diagnostic Studies:     EKG:     Vent. Rate : 076 BPM     Atrial Rate : 076 BPM     P-R Int : 166 ms          QRS Dur : 096 ms      QT Int : 386 ms       P-R-T Axes : 049 003 111 degrees     QTc Int : 434 ms    Normal sinus rhythm  Minimal voltage criteria for LVH, may be normal variant  T wave abnormality, consider lateral ischemia  Abnormal ECG  No previous ECGs available  Confirmed by Venkata Cruz MD ([id number]) on 3/22/2018 8:24:29 AM      ASSESSMENT/PLAN:         Pre-op Assessment     I have reviewed the Nursing Notes.   I have reviewed the Medications.     Review of Systems  Anesthesia Hx:  Denies  Hx of Anesthetic complications  History of prior surgery of interest to airway management or planning: Previous anesthesia: General Airway issues documented on chart review include GETA  Denies Family Hx of Anesthesia complications.   Denies Personal Hx of Anesthesia complications.   Social:  Non-Smoker, No Alcohol Use    Hematology/Oncology:     Oncology Normal    -- Anemia:   EENT/Dental:EENT/Dental Normal   Cardiovascular:   Exercise tolerance: good Hypertension ECG has been reviewed.    Pulmonary:  Pulmonary Normal    Hepatic/GI:   Perirectal fistula   Musculoskeletal:  Musculoskeletal Normal    Neurological:  Neurology Normal    Endocrine:   Diabetes, type 2    Dermatological:  Skin Normal    Psych:  Psychiatric Normal              Anesthesia Plan  Type of Anesthesia, risks & benefits discussed:  Anesthesia Type:  general  Patient's Preference:   Intra-op Monitoring Plan: standard ASA monitors  Intra-op Monitoring Plan Comments:   Post Op Pain Control Plan: multimodal analgesia, per primary service following discharge from PACU and IV/PO Opioids PRN  Post Op Pain Control Plan Comments:   Induction:   IV  Beta Blocker:  Patient is not currently on a Beta-Blocker (No further documentation required).       Informed Consent:    ASA Score:      Day of Surgery Review of History & Physical:

## 2018-09-04 ENCOUNTER — TELEPHONE (OUTPATIENT)
Dept: SURGERY | Facility: CLINIC | Age: 68
End: 2018-09-04

## 2018-09-04 NOTE — TELEPHONE ENCOUNTER
----- Message from Jayne Saunders sent at 9/4/2018 10:02 AM CDT -----  Contact: Pt:[phone]  .Needs Advice    Reason for call:Pt called and states he would like to speak with the nurse to cancel his surgery.      Communication Preference:Pt:[phone]  Additional Information:

## 2018-09-04 NOTE — TELEPHONE ENCOUNTER
Pt does not wish to have surgery. He believes the fistula has healed. He has not had any drainage since he was seen last. Will call if drainage starts again.

## 2018-09-26 ENCOUNTER — TELEPHONE (OUTPATIENT)
Dept: UROLOGY | Facility: CLINIC | Age: 68
End: 2018-09-26

## 2018-09-26 NOTE — TELEPHONE ENCOUNTER
Please send the patient a registered letter, with return receipt requested, concerning the patient's missed appointment for   elevated PSA                                                                               .  It is important that this be followed to make sure that cancer  of the     prostate does not develop                                     .  Please have the patient contact the office if  the patient wants to reschedule this appointment.

## 2021-05-04 ENCOUNTER — TELEPHONE (OUTPATIENT)
Dept: OPHTHALMOLOGY | Facility: CLINIC | Age: 71
End: 2021-05-04

## 2021-05-07 ENCOUNTER — TELEPHONE (OUTPATIENT)
Dept: OPTOMETRY | Facility: CLINIC | Age: 71
End: 2021-05-07

## 2021-05-12 ENCOUNTER — TELEPHONE (OUTPATIENT)
Dept: OPHTHALMOLOGY | Facility: CLINIC | Age: 71
End: 2021-05-12

## 2021-05-12 ENCOUNTER — TELEPHONE (OUTPATIENT)
Dept: OPTOMETRY | Facility: CLINIC | Age: 71
End: 2021-05-12

## 2021-05-17 ENCOUNTER — TELEPHONE (OUTPATIENT)
Dept: OPTOMETRY | Facility: CLINIC | Age: 71
End: 2021-05-17

## 2021-06-11 ENCOUNTER — OFFICE VISIT (OUTPATIENT)
Dept: OPHTHALMOLOGY | Facility: CLINIC | Age: 71
End: 2021-06-11
Payer: OTHER GOVERNMENT

## 2021-06-11 DIAGNOSIS — E11.9 DIABETES MELLITUS TYPE 2 WITHOUT RETINOPATHY: ICD-10-CM

## 2021-06-11 DIAGNOSIS — H25.13 NUCLEAR SCLEROTIC CATARACT, BILATERAL: ICD-10-CM

## 2021-06-11 DIAGNOSIS — H40.1134 PRIMARY OPEN-ANGLE GLAUCOMA, BILATERAL, INDETERMINATE STAGE: Primary | ICD-10-CM

## 2021-06-11 PROCEDURE — 92020 GONIOSCOPY: CPT | Mod: PBBFAC | Performed by: OPHTHALMOLOGY

## 2021-06-11 PROCEDURE — 92020 GONIOSCOPY: CPT | Mod: S$PBB,,, | Performed by: OPHTHALMOLOGY

## 2021-06-11 PROCEDURE — 92004 PR EYE EXAM, NEW PATIENT,COMPREHESV: ICD-10-PCS | Mod: S$PBB,,, | Performed by: OPHTHALMOLOGY

## 2021-06-11 PROCEDURE — 99999 PR PBB SHADOW E&M-EST. PATIENT-LVL III: ICD-10-PCS | Mod: PBBFAC,,, | Performed by: OPHTHALMOLOGY

## 2021-06-11 PROCEDURE — 99999 PR PBB SHADOW E&M-EST. PATIENT-LVL III: CPT | Mod: PBBFAC,,, | Performed by: OPHTHALMOLOGY

## 2021-06-11 PROCEDURE — 99213 OFFICE O/P EST LOW 20 MIN: CPT | Mod: PBBFAC | Performed by: OPHTHALMOLOGY

## 2021-06-11 PROCEDURE — 92004 COMPRE OPH EXAM NEW PT 1/>: CPT | Mod: S$PBB,,, | Performed by: OPHTHALMOLOGY

## 2021-06-11 PROCEDURE — 92020 PR SPECIAL EYE EVAL,GONIOSCOPY: ICD-10-PCS | Mod: S$PBB,,, | Performed by: OPHTHALMOLOGY

## 2021-09-01 ENCOUNTER — HOSPITAL ENCOUNTER (EMERGENCY)
Facility: OTHER | Age: 71
Discharge: HOME | End: 2021-09-01
Attending: EMERGENCY MEDICINE
Payer: MEDICARE

## 2021-09-01 VITALS
DIASTOLIC BLOOD PRESSURE: 99 MMHG | SYSTOLIC BLOOD PRESSURE: 139 MMHG | TEMPERATURE: 99 F | HEART RATE: 86 BPM | OXYGEN SATURATION: 96 % | RESPIRATION RATE: 20 BRPM

## 2021-09-01 DIAGNOSIS — T73.0XXA HUNGRY, INITIAL ENCOUNTER: Primary | ICD-10-CM

## 2021-09-01 DIAGNOSIS — Z60.9 PROBLEM RELATED TO SOCIAL ENVIRONMENT: ICD-10-CM

## 2021-09-01 LAB — POCT GLUCOSE: 88 MG/DL (ref 70–110)

## 2021-09-01 PROCEDURE — 99282 EMERGENCY DEPT VISIT SF MDM: CPT

## 2021-09-01 PROCEDURE — 82962 GLUCOSE BLOOD TEST: CPT

## 2021-09-01 SDOH — SOCIAL DETERMINANTS OF HEALTH (SDOH): PROBLEM RELATED TO SOCIAL ENVIRONMENT, UNSPECIFIED: Z60.9

## (undated) DEVICE — PAD PREP 50/CA

## (undated) DEVICE — SEE MEDLINE ITEM 154981

## (undated) DEVICE — NDL HYPDRM 23X15

## (undated) DEVICE — GLOVE SURG BIOGEL LATEX SZ 7.5

## (undated) DEVICE — SUT 2-0 ETHILON 18 FS

## (undated) DEVICE — SYR B-D DISP CONTROL 10CC100/C

## (undated) DEVICE — DRAIN PENROSE XRAY 18 X 1/4 ST

## (undated) DEVICE — GAUZE SPONGE 4'X4 12 PLY

## (undated) DEVICE — Device

## (undated) DEVICE — GAUZE SPONGE 4X4 12PLY

## (undated) DEVICE — COVER OVERHEAD SURG LT BLUE

## (undated) DEVICE — PAD ABDOMINAL 5X9 STERILE